# Patient Record
Sex: MALE | URBAN - METROPOLITAN AREA
[De-identification: names, ages, dates, MRNs, and addresses within clinical notes are randomized per-mention and may not be internally consistent; named-entity substitution may affect disease eponyms.]

---

## 2021-07-28 ENCOUNTER — APPOINTMENT (OUTPATIENT)
Dept: URBAN - METROPOLITAN AREA CLINIC 252 | Age: 81
Setting detail: DERMATOLOGY
End: 2021-07-28

## 2021-07-28 VITALS — HEIGHT: 65 IN | WEIGHT: 195 LBS | RESPIRATION RATE: 16 BRPM

## 2021-07-28 DIAGNOSIS — L57.0 ACTINIC KERATOSIS: ICD-10-CM

## 2021-07-28 DIAGNOSIS — L20.89 OTHER ATOPIC DERMATITIS: ICD-10-CM

## 2021-07-28 PROCEDURE — OTHER COUNSELING: OTHER

## 2021-07-28 PROCEDURE — OTHER PRESCRIPTION: OTHER

## 2021-07-28 PROCEDURE — 99203 OFFICE O/P NEW LOW 30 MIN: CPT | Mod: 25

## 2021-07-28 PROCEDURE — OTHER LIQUID NITROGEN: OTHER

## 2021-07-28 PROCEDURE — OTHER ADDITIONAL NOTES: OTHER

## 2021-07-28 PROCEDURE — 17003 DESTRUCT PREMALG LES 2-14: CPT

## 2021-07-28 PROCEDURE — 17000 DESTRUCT PREMALG LESION: CPT

## 2021-07-28 RX ORDER — BETAMETHASONE DIPROPIONATE 0.5 MG/G
CREAM TOPICAL BID PRN
Qty: 1 | Refills: 2 | COMMUNITY
Start: 2021-07-28

## 2021-07-28 ASSESSMENT — LOCATION SIMPLE DESCRIPTION DERM
LOCATION SIMPLE: RIGHT TEMPLE
LOCATION SIMPLE: LEFT PRETIBIAL REGION
LOCATION SIMPLE: RIGHT PRETIBIAL REGION
LOCATION SIMPLE: RIGHT ZYGOMA
LOCATION SIMPLE: LEFT CHEEK

## 2021-07-28 ASSESSMENT — LOCATION DETAILED DESCRIPTION DERM
LOCATION DETAILED: RIGHT CENTRAL ZYGOMA
LOCATION DETAILED: RIGHT LATERAL ZYGOMA
LOCATION DETAILED: LEFT CENTRAL MALAR CHEEK
LOCATION DETAILED: RIGHT CENTRAL TEMPLE
LOCATION DETAILED: LEFT PROXIMAL PRETIBIAL REGION
LOCATION DETAILED: LEFT SUPERIOR LATERAL MALAR CHEEK
LOCATION DETAILED: RIGHT PROXIMAL PRETIBIAL REGION

## 2021-07-28 ASSESSMENT — LOCATION ZONE DERM
LOCATION ZONE: LEG
LOCATION ZONE: FACE

## 2021-07-28 NOTE — HPI: RASH
How Severe Is Your Rash?: severe
Is This A New Presentation, Or A Follow-Up?: Rash
Additional History: Responds to betamethasone he reports immediatly. Uses eucerin cream as well periodically.

## 2021-07-28 NOTE — PROCEDURE: ADDITIONAL NOTES
Additional Notes: Continue using Eczema relief cream daily.
Detail Level: Detailed
Render Risk Assessment In Note?: no

## 2021-12-06 ENCOUNTER — LAB REQUISITION (OUTPATIENT)
Dept: LAB | Facility: CLINIC | Age: 81
End: 2021-12-06
Payer: COMMERCIAL

## 2021-12-06 DIAGNOSIS — I10 ESSENTIAL (PRIMARY) HYPERTENSION: ICD-10-CM

## 2021-12-06 LAB
ANION GAP SERPL CALCULATED.3IONS-SCNC: 14 MMOL/L (ref 5–18)
BASOPHILS # BLD AUTO: 0 10E3/UL (ref 0–0.2)
BASOPHILS NFR BLD AUTO: 1 %
BUN SERPL-MCNC: 9 MG/DL (ref 8–28)
CALCIUM SERPL-MCNC: 8.7 MG/DL (ref 8.5–10.5)
CHLORIDE BLD-SCNC: 109 MMOL/L (ref 98–107)
CO2 SERPL-SCNC: 18 MMOL/L (ref 22–31)
CREAT SERPL-MCNC: 0.8 MG/DL (ref 0.7–1.3)
EOSINOPHIL # BLD AUTO: 0.2 10E3/UL (ref 0–0.7)
EOSINOPHIL NFR BLD AUTO: 3 %
ERYTHROCYTE [DISTWIDTH] IN BLOOD BY AUTOMATED COUNT: 16.8 % (ref 10–15)
GFR SERPL CREATININE-BSD FRML MDRD: 85 ML/MIN/1.73M2
GLUCOSE BLD-MCNC: 66 MG/DL (ref 70–125)
HCT VFR BLD AUTO: 33.3 % (ref 40–53)
HGB BLD-MCNC: 10.2 G/DL (ref 13.3–17.7)
IMM GRANULOCYTES # BLD: 0 10E3/UL
IMM GRANULOCYTES NFR BLD: 0 %
LYMPHOCYTES # BLD AUTO: 1.6 10E3/UL (ref 0.8–5.3)
LYMPHOCYTES NFR BLD AUTO: 27 %
MCH RBC QN AUTO: 30.4 PG (ref 26.5–33)
MCHC RBC AUTO-ENTMCNC: 30.6 G/DL (ref 31.5–36.5)
MCV RBC AUTO: 99 FL (ref 78–100)
MONOCYTES # BLD AUTO: 0.8 10E3/UL (ref 0–1.3)
MONOCYTES NFR BLD AUTO: 12 %
NEUTROPHILS # BLD AUTO: 3.5 10E3/UL (ref 1.6–8.3)
NEUTROPHILS NFR BLD AUTO: 57 %
NRBC # BLD AUTO: 0 10E3/UL
NRBC BLD AUTO-RTO: 0 /100
PLATELET # BLD AUTO: 229 10E3/UL (ref 150–450)
POTASSIUM BLD-SCNC: 3.9 MMOL/L (ref 3.5–5)
RBC # BLD AUTO: 3.35 10E6/UL (ref 4.4–5.9)
SODIUM SERPL-SCNC: 141 MMOL/L (ref 136–145)
VIT B12 SERPL-MCNC: 994 PG/ML (ref 213–816)
WBC # BLD AUTO: 6.2 10E3/UL (ref 4–11)

## 2021-12-06 PROCEDURE — 82306 VITAMIN D 25 HYDROXY: CPT | Mod: ORL | Performed by: NURSE PRACTITIONER

## 2021-12-06 PROCEDURE — 80048 BASIC METABOLIC PNL TOTAL CA: CPT | Mod: ORL | Performed by: NURSE PRACTITIONER

## 2021-12-06 PROCEDURE — 85025 COMPLETE CBC W/AUTO DIFF WBC: CPT | Mod: ORL | Performed by: NURSE PRACTITIONER

## 2021-12-06 PROCEDURE — 82607 VITAMIN B-12: CPT | Mod: ORL | Performed by: NURSE PRACTITIONER

## 2021-12-07 LAB — DEPRECATED CALCIDIOL+CALCIFEROL SERPL-MC: 36 UG/L (ref 30–80)

## 2022-05-03 ENCOUNTER — TRANSFERRED RECORDS (OUTPATIENT)
Dept: HEALTH INFORMATION MANAGEMENT | Facility: CLINIC | Age: 82
End: 2022-05-03
Payer: COMMERCIAL

## 2022-05-03 ENCOUNTER — MEDICAL CORRESPONDENCE (OUTPATIENT)
Dept: HEALTH INFORMATION MANAGEMENT | Facility: CLINIC | Age: 82
End: 2022-05-03
Payer: COMMERCIAL

## 2022-05-03 PROBLEM — C90.01 MULTIPLE MYELOMA IN REMISSION (H): Status: ACTIVE | Noted: 2022-01-19

## 2022-05-03 PROBLEM — N18.31 STAGE 3A CHRONIC KIDNEY DISEASE (H): Status: ACTIVE | Noted: 2021-07-25

## 2022-05-03 PROBLEM — I25.119 CORONARY ARTERY DISEASE INVOLVING NATIVE HEART WITH ANGINA PECTORIS (H): Status: ACTIVE | Noted: 2018-05-25

## 2022-05-03 PROBLEM — J30.9 ALLERGIC RHINITIS: Status: ACTIVE | Noted: 2021-12-01

## 2022-05-03 PROBLEM — R25.1 TREMOR: Status: ACTIVE | Noted: 2022-05-03

## 2022-05-03 PROBLEM — I25.10 ARTERIOSCLEROSIS OF CORONARY ARTERY: Status: ACTIVE | Noted: 2018-06-22

## 2022-05-03 PROBLEM — E78.5 HYPERLIPIDEMIA LDL GOAL <70: Status: ACTIVE | Noted: 2018-06-22

## 2022-05-03 RX ORDER — FERROUS SULFATE 325(65) MG
325 TABLET ORAL DAILY
COMMUNITY
Start: 2021-03-21

## 2022-05-03 RX ORDER — ALBUTEROL SULFATE 90 UG/1
1-2 AEROSOL, METERED RESPIRATORY (INHALATION) EVERY 6 HOURS PRN
COMMUNITY
Start: 2021-04-13 | End: 2022-09-27

## 2022-05-03 RX ORDER — BETAMETHASONE DIPROPIONATE 0.5 MG/G
CREAM TOPICAL
COMMUNITY
End: 2022-09-27

## 2022-05-03 RX ORDER — QUETIAPINE FUMARATE 25 MG/1
25 TABLET, FILM COATED ORAL AT BEDTIME
COMMUNITY
End: 2022-09-27

## 2022-05-03 RX ORDER — MIDODRINE HYDROCHLORIDE 2.5 MG/1
TABLET ORAL
COMMUNITY
Start: 2022-05-03 | End: 2022-09-27

## 2022-05-03 RX ORDER — FLUTICASONE PROPIONATE 50 MCG
1-2 SPRAY, SUSPENSION (ML) NASAL DAILY PRN
COMMUNITY

## 2022-05-03 RX ORDER — LENALIDOMIDE 10 MG/1
CAPSULE ORAL
COMMUNITY
Start: 2022-04-15 | End: 2022-09-27

## 2022-05-03 RX ORDER — LATANOPROST 50 UG/ML
SOLUTION/ DROPS OPHTHALMIC
COMMUNITY
End: 2022-09-27

## 2022-05-03 RX ORDER — PROCHLORPERAZINE MALEATE 10 MG
TABLET ORAL
COMMUNITY
Start: 2021-07-27 | End: 2022-09-27

## 2022-05-03 RX ORDER — FAMOTIDINE 20 MG/1
TABLET, FILM COATED ORAL
COMMUNITY
Start: 2022-02-16

## 2022-05-03 RX ORDER — ONDANSETRON 4 MG/1
4 TABLET, ORALLY DISINTEGRATING ORAL DAILY PRN
COMMUNITY
Start: 2021-11-16 | End: 2022-10-03

## 2022-05-03 RX ORDER — LANOLIN ALCOHOL/MO/W.PET/CERES
6 CREAM (GRAM) TOPICAL AT BEDTIME
COMMUNITY
Start: 2021-11-29 | End: 2022-10-03

## 2022-05-03 RX ORDER — NITROGLYCERIN 0.4 MG/1
0.4 TABLET SUBLINGUAL PRN
COMMUNITY
Start: 2020-08-11

## 2022-05-03 RX ORDER — FLUOCINONIDE 0.5 MG/G
1 OINTMENT TOPICAL 2 TIMES DAILY
COMMUNITY

## 2022-05-03 RX ORDER — ZINC SULFATE 50(220)MG
220 CAPSULE ORAL DAILY
COMMUNITY

## 2022-05-03 RX ORDER — LOPERAMIDE HCL 2 MG
CAPSULE ORAL
COMMUNITY
Start: 2021-11-09

## 2022-09-06 ENCOUNTER — APPOINTMENT (OUTPATIENT)
Dept: URBAN - METROPOLITAN AREA CLINIC 252 | Age: 82
Setting detail: DERMATOLOGY
End: 2022-09-12

## 2022-09-06 VITALS — RESPIRATION RATE: 16 BRPM | HEIGHT: 65 IN | WEIGHT: 187 LBS

## 2022-09-06 DIAGNOSIS — L72.0 EPIDERMAL CYST: ICD-10-CM

## 2022-09-06 DIAGNOSIS — L57.0 ACTINIC KERATOSIS: ICD-10-CM

## 2022-09-06 PROCEDURE — 99212 OFFICE O/P EST SF 10 MIN: CPT | Mod: 25

## 2022-09-06 PROCEDURE — OTHER LIQUID NITROGEN: OTHER

## 2022-09-06 PROCEDURE — 17003 DESTRUCT PREMALG LES 2-14: CPT

## 2022-09-06 PROCEDURE — OTHER COUNSELING: OTHER

## 2022-09-06 PROCEDURE — 17000 DESTRUCT PREMALG LESION: CPT

## 2022-09-06 ASSESSMENT — LOCATION SIMPLE DESCRIPTION DERM
LOCATION SIMPLE: LEFT ZYGOMA
LOCATION SIMPLE: RIGHT TEMPLE
LOCATION SIMPLE: LEFT EAR
LOCATION SIMPLE: LEFT CHEEK

## 2022-09-06 ASSESSMENT — LOCATION DETAILED DESCRIPTION DERM
LOCATION DETAILED: LEFT SUPERIOR LATERAL MALAR CHEEK
LOCATION DETAILED: RIGHT CENTRAL TEMPLE
LOCATION DETAILED: LEFT CENTRAL ZYGOMA
LOCATION DETAILED: LEFT INFERIOR CENTRAL MALAR CHEEK
LOCATION DETAILED: LEFT SUPERIOR CRUS OF ANTIHELIX

## 2022-09-06 ASSESSMENT — LOCATION ZONE DERM
LOCATION ZONE: EAR
LOCATION ZONE: FACE

## 2022-09-06 NOTE — PROCEDURE: LIQUID NITROGEN
Consent: The patient's consent was obtained including but not limited to risks of crusting, scabbing, blistering, scarring, darker or lighter pigmentary change, recurrence, incomplete removal and infection.
Render Note In Bullet Format When Appropriate: No
Duration Of Freeze Thaw-Cycle (Seconds): 0
Show Aperture Variable?: Yes
Post-Care Instructions: I reviewed with the patient in detail post-care instructions. Patient is to wear sunprotection, and avoid picking at any of the treated lesions. Pt may apply Vaseline to crusted or scabbing areas.
Detail Level: Detailed

## 2022-09-15 NOTE — TELEPHONE ENCOUNTER
Action 9/15/22 MV 10.45am   Action Taken Imaging request faxed to [facility]    9/16/22 MV 2.53pm  Images resolved iN PACS         RECORDS RECEIVED FROM: internal   REASON FOR VISIT: Essential tremor  low bp and tremor meds drive it down further   Date of Appt: 10/3/22   NOTES (FOR ALL VISITS) STATUS DETAILS   OFFICE NOTE from referring provider Received  Dr Raheem Coley @ [facility]:  5/3/22   MEDICATION LIST Care Everywhere    IMAGING  (FOR ALL VISITS)     MRI (HEAD, NECK, SPINE) Received [facility]:  MRI Brain 12/21/19  MRA Head Neck 12/21/19   CT (HEAD, NECK, SPINE) Received [facility]:  CT Head 11/24/21  CT Head 11/22/21  CT Head 11/16/21

## 2022-09-27 RX ORDER — NITROGLYCERIN 0.4 MG/1
0.4 TABLET SUBLINGUAL EVERY 5 MIN PRN
COMMUNITY
Start: 2022-05-13 | End: 2022-10-03

## 2022-09-27 RX ORDER — QUETIAPINE FUMARATE 25 MG/1
25 TABLET, FILM COATED ORAL
COMMUNITY
End: 2022-10-03

## 2022-09-27 RX ORDER — ALBUTEROL SULFATE 90 UG/1
1-2 AEROSOL, METERED RESPIRATORY (INHALATION) EVERY 4 HOURS PRN
COMMUNITY
Start: 2021-04-13 | End: 2022-10-03

## 2022-09-27 RX ORDER — CHOLESTYRAMINE LIGHT 4 G/5.7G
1 POWDER, FOR SUSPENSION ORAL DAILY
COMMUNITY
Start: 2021-11-30 | End: 2022-10-03

## 2022-09-27 RX ORDER — LENALIDOMIDE 10 MG/1
10 CAPSULE ORAL DAILY
COMMUNITY
Start: 2022-08-04

## 2022-09-27 RX ORDER — LANOLIN ALCOHOL/MO/W.PET/CERES
6 CREAM (GRAM) TOPICAL EVERY EVENING
COMMUNITY
Start: 2021-11-29 | End: 2022-10-03

## 2022-09-27 RX ORDER — BRINZOLAMIDE/BRIMONIDINE TARTRATE 10; 2 MG/ML; MG/ML
1 SUSPENSION/ DROPS OPHTHALMIC 2 TIMES DAILY
COMMUNITY
Start: 2021-11-30

## 2022-09-27 RX ORDER — MIDODRINE HYDROCHLORIDE 2.5 MG/1
TABLET ORAL
COMMUNITY
Start: 2021-10-05

## 2022-09-27 RX ORDER — MV-MIN/FOLIC/VIT K/LYCOP/COQ10 200-100MCG
CAPSULE ORAL
COMMUNITY
Start: 2022-06-17

## 2022-09-27 RX ORDER — PROCHLORPERAZINE MALEATE 10 MG
10 TABLET ORAL
COMMUNITY
Start: 2021-07-27

## 2022-09-27 RX ORDER — ACYCLOVIR 400 MG/1
400 TABLET ORAL 2 TIMES DAILY
COMMUNITY
Start: 2021-07-27

## 2022-09-27 RX ORDER — BETAMETHASONE DIPROPIONATE 0.5 MG/G
CREAM TOPICAL 2 TIMES DAILY PRN
COMMUNITY

## 2022-09-27 RX ORDER — ONDANSETRON 4 MG/1
1 TABLET, ORALLY DISINTEGRATING ORAL EVERY 8 HOURS PRN
COMMUNITY
Start: 2021-12-22

## 2022-09-27 RX ORDER — FAMOTIDINE 20 MG/1
1 TABLET, FILM COATED ORAL 2 TIMES DAILY
COMMUNITY
Start: 2022-02-16 | End: 2022-10-03

## 2022-09-27 RX ORDER — SULFAMETHOXAZOLE/TRIMETHOPRIM 800-160 MG
1 TABLET ORAL
COMMUNITY
Start: 2022-06-24

## 2022-09-27 RX ORDER — ATORVASTATIN CALCIUM 40 MG/1
40 TABLET, FILM COATED ORAL DAILY
COMMUNITY
Start: 2022-05-13

## 2022-09-27 RX ORDER — FLUOXETINE 40 MG/1
1 CAPSULE ORAL DAILY
COMMUNITY
Start: 2022-03-03

## 2022-09-27 RX ORDER — PRIMIDONE 50 MG/1
12.5 TABLET ORAL
COMMUNITY
Start: 2022-05-04 | End: 2022-10-03

## 2022-09-27 RX ORDER — BUPROPION HYDROCHLORIDE 300 MG/1
300 TABLET ORAL DAILY
COMMUNITY
Start: 2022-09-26 | End: 2023-09-26

## 2022-09-27 NOTE — PROGRESS NOTES
"Department of Neurology  Movement Disorders Division   New Patient Visit    Patient: Mk Diego   MRN: [id number]   : 1940   Date of Visit: 10/3/2022    CC: essential tremor, ?DBS    HPI:  Mr. Diego is an 81 yo right handed man who presents for treatment of ET.  He is accompanied by his wfie and daughter who help to provide history.  His tremor has been present for at least 20 years but has worsened over the past 10 years.  It is worse in his right hand.  Recently started to notice head tremor while walking.  While resting and standing, his legs \"begin to jump\".  His two brothers have it as well.He has a lot of trouble doing anything fine with is hands.  He can no longer reload his guns, work as a , or eat.  His balance is poor.  He is falling daily but has been able to catch himself.  His balance is worse when walking slowing.    He had a concussion last November.  Memory is good.    Hypotension with propranolol.  He has been increasing primidone.  Initially had sleepiness and \"foggy head\" but this has improved.  Maybe helped with his tremor at first.    No hx of kidney stones.  He doesn't drink alcohol.       Tremor ADL Scale  1. Speaking 0 (0) Normal   2. Feeding with a spoon 3 (3) Moderately abnormal. Spills a lot or changes strategy to complete task such as using two hands or leaning over.   3. Drinking from a glass 4 (4) Severely abnormal. Cannot drink from a glass or uses straw or sippy cup.   4. Hygiene 3 (3) Moderately abnormal. Unable to do most fine tasks such as putting on lipstick or shaving unless changes strategy such as using two hands or using the less affected hand.   5. Dressing 2 (2) Mildly abnormal. Able to do everything but has difficulty due to tremor.   6. Pouring 3 (3) Moderately abnormal. Must use two hands or uses other strategies to avoid spilling.   7. Carrying food trays, plates or similar items 3 (3) Moderately abnormal. Uses strategies such as holding tightly " against body to carry.   8. Using keys 2 (2) Mildly abnormal. Commonly misses target but still routinely puts key in lock with one hand.   9. Writing 3 (3) Moderately abnormal. Cannot write without using strategies such as holding the writing hand with the other hand, holding pen differently or using large pen.   10. Working 3 (3) Moderately abnormal. Unable to continue working without using strategies such as changing jobs or using special equipment.   11. Overall disability with the most affected task 4 (4) Severely abnormal. Cannot do the task.   Name of most affected task Reloading gun, shooting Reloading gun, shooting   12. Social impact 1 (1) Aware of tremor, but it does not affect lifestyle or professional life.   ADL TOTAL 31        Related Medications Afternoon Evening   Primidone 50mg 0.5 1   Last taken at last night 10/2/2022    ROS:  All others negative except as listed above.    Past Medical History:   Diagnosis Date     BPH (benign prostatic hyperplasia)      Chronic gastritis      Chronic kidney disease, stage 3a (H)      Coronary arteriosclerosis      ED (erectile dysfunction)      Hip arthritis      Hypotension      Iron deficiency anemia      Major depressive disorder      MGUS (monoclonal gammopathy of unknown significance)      Multiple myeloma (H)      Normocytic normochromic anemia      Primary insomnia      Seborrheic dermatitis      Tremor 05/03/2022     Vitamin D deficiency         Past Surgical History:   Procedure Laterality Date     ARTHRODESIS WRIST Bilateral     x2 right , then x1 on left prior to the 2014     CV PCI       HC REPAIR INTERCARP/CARP-METACARP JT Left 07/01/2014    Procedure: CARPAL METACARPAL ARTHROPLASTY THUMB;  Surgeon: Nomi Judd MD;  Location: [facility];  Service: Orthopedics     LAPAROSCOPIC CHOLECYSTECTOMY       OTHER SURGICAL HISTORY      paraesophageal hiatal hernia repair     ZZC FUSION INTERCARPAL Left 08/09/2016    Procedure: LEFT ARTHRODESIS  REVISION LEFT WRIST WITH ILIAC CREST BONE GRAFT;  Surgeon: Nomi Judd MD;  Location: [facility];  Service: Orthopedics        Current Outpatient Medications   Medication Sig Dispense Refill     Acetylcysteine 600 MG TBCR        acyclovir (ZOVIRAX) 400 MG tablet Take 400 mg by mouth 2 times daily       atorvastatin (LIPITOR) 40 MG tablet Take 40 mg by mouth daily       betamethasone dipropionate (DIPROSONE) 0.05 % external cream Apply topically 2 times daily as needed       brinzolamide-brimonidine (SIMBRINZA) 1-0.2 % ophthalmic suspension Apply 1 drop to eye 2 times daily       buPROPion (WELLBUTRIN XL) 300 MG 24 hr tablet Take 300 mg by mouth daily       cholecalciferol 25 MCG (1000 UT) TABS Take 1,000 Units by mouth daily       famotidine (PEPCID) 20 MG tablet 20mg       ferrous sulfate (FEROSUL) 325 (65 Fe) MG tablet Take 325 mg by mouth daily       fluocinonide (LIDEX) 0.05 % external ointment Apply 1 Application topically 2 times daily       FLUoxetine (PROZAC) 40 MG capsule Take 1 capsule by mouth daily       fluticasone (FLONASE) 50 MCG/ACT nasal spray Spray 1-2 sprays in nostril daily as needed       latanoprost (XALATAN) 0.005 % ophthalmic solution [LATANOPROST (XALATAN) 0.005 % OPHTHALMIC SOLUTION] Administer 1 drop to both eyes bedtime.       LENalidomide (REVLIMID) 10 MG CAPS capsule Take 10 mg by mouth daily On day 1-21 of each 28 day cycle       loperamide (IMODIUM) 2 MG capsule Take 4mg by mouth with 1st loose stool, then 2mg with each subsequent loose stool. Max 16 mg in 24 hrs       midodrine (PROAMATINE) 2.5 MG tablet 15mg in the morning and 12.5mg in the evening       Multiple Vitamins-Minerals (DAILY MULTIVITAMIN) CAPS        nitroGLYcerin (NITROSTAT) 0.4 MG sublingual tablet Place 0.4 mg under the tongue as needed       ondansetron (ZOFRAN ODT) 4 MG ODT tab Take 1 tablet by mouth every 8 hours as needed       primidone (MYSOLINE) 50 MG tablet Take 12.5 mg by mouth       Probiotic  Product (PROBIOTIC PO) Take 1 capsule by mouth       prochlorperazine (COMPAZINE) 10 MG tablet Take 10 mg by mouth       sulfamethoxazole-trimethoprim (BACTRIM DS) 800-160 MG tablet Take 1 tablet by mouth Every Monday, Wednesday, and Friday       vitamin B-12 (CYANOCOBALAMIN) 1000 MCG tablet Take 1,000 mcg by mouth daily       zinc sulfate (ZINCATE) 220 (50 Zn) MG capsule Take 220 mg by mouth daily         Allergies   Allergen Reactions     Codeine Nausea and Vomiting and Nausea     Patient reports that he has tolerated morphine in the past  Patient reports that he has tolerated morphine in the past       Demerol [Meperidine] Nausea and Vomiting     Fentanyl Nausea and Vomiting     Prilocaine Hives and Unknown     Afluria Preservative Free Nausea and Vomiting     Anesthetics, Amide [Amides]      Dry heaves     Lidocaine Unknown and Other (See Comments)     Dry heaves  Dry heaves     Morphine Nausea and Nausea and Vomiting     Tamsulosin Other (See Comments)     Other reaction(s): Hypotension        Family History   Problem Relation Age of Onset     Osteoporosis Mother      Depression Father      Tremor Brother      Tremor Brother      Cancer Maternal Grandfather      Tremor Niece         Social History     Socioeconomic History     Marital status:      Spouse name: Not on file     Number of children: Not on file     Years of education: Not on file     Highest education level: Not on file   Occupational History     Not on file   Tobacco Use     Smoking status: Former Smoker     Packs/day: 4.00     Years: 7.00     Pack years: 28.00     Quit date: 1968     Years since quittin.2     Smokeless tobacco: Never Used   Substance and Sexual Activity     Alcohol use: No     Comment: Alcoholic Drinks/day: quit      Drug use: No     Sexual activity: Not on file   Other Topics Concern     Not on file   Social History Narrative     Not on file     Social Determinants of Health     Financial Resource Strain:  "Not on file   Food Insecurity: Not on file   Transportation Needs: Not on file   Physical Activity: Not on file   Stress: Not on file   Social Connections: Not on file   Intimate Partner Violence: Not on file   Housing Stability: Not on file        PHYSICAL EXAM:  BP (!) 147/70 (BP Location: Left arm, Patient Position: Chair, Cuff Size: Adult Regular)   Pulse 73   Ht 1.651 m (5' 5\")   Wt 85.2 kg (187 lb 14.4 oz)   SpO2 99%   BMI 31.27 kg/m      Gen: alert, active, attentive, appropriately groomed   HEENT: normocephalic, eyes open with no discharge  Psych: mood stable     NEURO:  CN:  II:  VFF.  III, IV, VI: EOM normal range, no nystagmus.  Normal saccades.  V:  Facial sensation intact.  VII: Face symmetric at rest and with activation  VIII: Intact to finger rub bilaterally.  IX, X: Palate rise b/l, uvula midline.  No dysarthria.  XI: Trapezius and SCM 5/5 bilaterally.  XII: Tongue protrudes midline. No fasciculation or atrophy noted.    Motor:  Normal bulk and tone throughout upper and lower extremities. See TETRAS below.  R/L  Shoulder abd      5/5  Elbow flex  5/5  Elbow ext  5/5     5/5    Hip flex   5/5  Knee flex  5/5  Knee ext  5/5  Dorsiflex  5/5  Plantar flex  5/5    Reflexes:  R/L  Biceps   1+/2+  Patellar  2+/1+  Achilles  1/1  No clonus.    Sensation:  Intact to LT in all extremities.    Coordination:  FTN and HTN intact bilaterally.    Gait:  Unsteady on quick pivot turn, narrow based.  Romberg negative.      TETRAS:  Motor (Performance) Sub-Scale 10/3/2022   Assessment Time 8:20 AM   Medication On   DBS - Right Brain None   DBS - Left Brain None   Head 1.5   Face & Jaw 0   Voice 2   Outstretched - RIGHT 1.5   Outstretched - LEFT 1.5   Wingbeating - RIGHT 2.5   Wingbeating - LEFT 2   Kinetic - RIGHT 1.5   Kinetic - LEFT 2   Lower Limb - RIGHT 0   Lower Limb - LEFT 2   Lower Limb (Max) 2   Spiral - RIGHT 4   Spiral - LEFT 3   Handwriting 4   Dot approx - RIGHT 2   Dot approx - LEFT 2.5   Trunk " (Standing) 2   Total Right 11.5   Total Left 13   Axial 3.5   TOTAL 34           ASSESSMENT/PLAN:  Mr. Diego is an 83 yo right handed man who presents for treatment of ET.  He did not tolerate propranolol due to hypotension and, thus far, primidone has not been effective enough in controlling his tremor.  We discussed the options of increasing primidone, topiramate, and gabapentin.  We also discussed DBS therapy.  He is interested in beginning the evaluation process while we increase his primidone further.    - Increase primidone to 100mg BID as tolerated  - Neuropsych and neurosurgery referrals placed  - 3T MRI ordered      Sada Matt MD    Movement Disorders Division  Department of Neurology      53 minutes spent on the date of the encounter doing chart review, history and exam, documentation and further activities as noted above.

## 2022-10-03 ENCOUNTER — OFFICE VISIT (OUTPATIENT)
Dept: NEUROLOGY | Facility: CLINIC | Age: 82
End: 2022-10-03
Payer: COMMERCIAL

## 2022-10-03 ENCOUNTER — PRE VISIT (OUTPATIENT)
Dept: NEUROLOGY | Facility: CLINIC | Age: 82
End: 2022-10-03

## 2022-10-03 ENCOUNTER — TELEPHONE (OUTPATIENT)
Dept: NEUROLOGY | Facility: CLINIC | Age: 82
End: 2022-10-03

## 2022-10-03 VITALS
BODY MASS INDEX: 31.31 KG/M2 | WEIGHT: 187.9 LBS | HEIGHT: 65 IN | SYSTOLIC BLOOD PRESSURE: 147 MMHG | OXYGEN SATURATION: 99 % | DIASTOLIC BLOOD PRESSURE: 70 MMHG | HEART RATE: 73 BPM

## 2022-10-03 DIAGNOSIS — G25.0 ESSENTIAL TREMOR: Primary | ICD-10-CM

## 2022-10-03 DIAGNOSIS — R26.81 GAIT INSTABILITY: ICD-10-CM

## 2022-10-03 DIAGNOSIS — I95.1 ORTHOSTATIC HYPOTENSION: ICD-10-CM

## 2022-10-03 PROCEDURE — 99204 OFFICE O/P NEW MOD 45 MIN: CPT | Performed by: STUDENT IN AN ORGANIZED HEALTH CARE EDUCATION/TRAINING PROGRAM

## 2022-10-03 RX ORDER — PRIMIDONE 50 MG/1
TABLET ORAL
Qty: 120 TABLET | Refills: 11 | Status: SHIPPED | OUTPATIENT
Start: 2022-10-03 | End: 2023-11-13

## 2022-10-03 ASSESSMENT — ACTIVITIES OF DAILY LIVING (ADL)
WRITING: (3) MODERATELY ABNORMAL. CANNOT WRITE WITHOUT USING STRATEGIES SUCH AS HOLDING THE WRITING HAND WITH THE OTHER HAND, HOLDING PEN DIFFERENTLY OR USING LARGE PEN.
HYGIENE: (3) MODERATELY ABNORMAL. UNABLE TO DO MOST FINE TASKS SUCH AS PUTTING ON LIPSTICK OR SHAVING UNLESS CHANGES STRATEGY SUCH AS USING TWO HANDS OR USING THE LESS AFFECTED HAND.
TOTAL_SCORE: 31
USING_KEYS: (2) MILDLY ABNORMAL. COMMONLY MISSES TARGET BUT STILL ROUTINELY PUTS KEY IN LOCK WITH ONE HAND.
SOCIAL_IMPACT: (1) AWARE OF TREMOR, BUT IT DOES NOT AFFECT LIFESTYLE OR PROFESSIONAL LIFE.
FEEDING_WITH_A_SPOON: (3) MODERATELY ABNORMAL. SPILLS A LOT OR CHANGES STRATEGY TO COMPLETE TASK SUCH AS USING TWO HANDS OR LEANING OVER.
OVERALL_DISABILITY_WITH_THE_MOST_AFFECTED_TASK: (4) SEVERELY ABNORMAL. CANNOT DO THE TASK.
SPEAKING: (0) NORMAL
CARRYING_FOOD_TRAYS_PLATES_OR_SIMILAR_ITEMS: (3) MODERATELY ABNORMAL. USES STRATEGIES SUCH AS HOLDING TIGHTLY AGAINST BODY TO CARRY.
DRINKING_FROM_A_GLASS: (4) SEVERELY ABNORMAL. CANNOT DRINK FROM A GLASS OR USES STRAW OR SIPPY CUP.
WORKING: (3) MODERATELY ABNORMAL. UNABLE TO CONTINUE WORKING WITHOUT USING STRATEGIES SUCH AS CHANGING JOBS OR USING SPECIAL EQUIPMENT.
DRESS: (2) MILDLY ABNORMAL. ABLE TO DO EVERYTHING BUT HAS DIFFICULTY DUE TO TREMOR.
POURING: (3) MODERATELY ABNORMAL. MUST USE TWO HANDS OR USES OTHER STRATEGIES TO AVOID SPILLING.

## 2022-10-03 ASSESSMENT — PAIN SCALES - GENERAL: PAINLEVEL: NO PAIN (0)

## 2022-10-03 NOTE — LETTER
"10/3/2022       RE: Mk Diego  [address] [facility] [address]     Dear Colleague,    Thank you for referring your patient, Mk Diego, to the [facility] [place] [facility]. Please see a copy of my visit note below.    Department of Neurology  Movement Disorders Division   New Patient Visit    Patient: Mk Diego   MRN: [id number]   : 1940   Date of Visit: 10/3/2022    CC: essential tremor, ?DBS    HPI:  Mr. Diego is an 81 yo right handed man who presents for treatment of ET.  He is accompanied by his wfie and daughter who help to provide history.  His tremor has been present for at least 20 years but has worsened over the past 10 years.  It is worse in his right hand.  Recently started to notice head tremor while walking.  While resting and standing, his legs \"begin to jump\".  His two brothers have it as well.He has a lot of trouble doing anything fine with is hands.  He can no longer reload his guns, work as a , or eat.  His balance is poor.  He is falling daily but has been able to catch himself.  His balance is worse when walking slowing.    He had a concussion last November.  Memory is good.    Hypotension with propranolol.  He has been increasing primidone.  Initially had sleepiness and \"foggy head\" but this has improved.  Maybe helped with his tremor at first.    No hx of kidney stones.  He doesn't drink alcohol.       Tremor ADL Scale  1. Speaking 0 (0) Normal   2. Feeding with a spoon 3 (3) Moderately abnormal. Spills a lot or changes strategy to complete task such as using two hands or leaning over.   3. Drinking from a glass 4 (4) Severely abnormal. Cannot drink from a glass or uses straw or sippy cup.   4. Hygiene 3 (3) Moderately abnormal. Unable to do most fine tasks such as putting on lipstick or shaving unless changes strategy such as using two hands or using the less affected hand.   5. " Dressing 2 (2) Mildly abnormal. Able to do everything but has difficulty due to tremor.   6. Pouring 3 (3) Moderately abnormal. Must use two hands or uses other strategies to avoid spilling.   7. Carrying food trays, plates or similar items 3 (3) Moderately abnormal. Uses strategies such as holding tightly against body to carry.   8. Using keys 2 (2) Mildly abnormal. Commonly misses target but still routinely puts key in lock with one hand.   9. Writing 3 (3) Moderately abnormal. Cannot write without using strategies such as holding the writing hand with the other hand, holding pen differently or using large pen.   10. Working 3 (3) Moderately abnormal. Unable to continue working without using strategies such as changing jobs or using special equipment.   11. Overall disability with the most affected task 4 (4) Severely abnormal. Cannot do the task.   Name of most affected task Reloading gun, shooting Reloading gun, shooting   12. Social impact 1 (1) Aware of tremor, but it does not affect lifestyle or professional life.   ADL TOTAL 31        Related Medications Afternoon Evening   Primidone 50mg 0.5 1   Last taken at last night 10/2/2022    ROS:  All others negative except as listed above.    Past Medical History:   Diagnosis Date     BPH (benign prostatic hyperplasia)      Chronic gastritis      Chronic kidney disease, stage 3a (H)      Coronary arteriosclerosis      ED (erectile dysfunction)      Hip arthritis      Hypotension      Iron deficiency anemia      Major depressive disorder      MGUS (monoclonal gammopathy of unknown significance)      Multiple myeloma (H)      Normocytic normochromic anemia      Primary insomnia      Seborrheic dermatitis      Tremor 05/03/2022     Vitamin D deficiency         Past Surgical History:   Procedure Laterality Date     ARTHRODESIS WRIST Bilateral     x2 right , then x1 on left prior to the 2014     CV PCI       HC REPAIR INTERCARP/CARP-METACARP JT Left 07/01/2014     Procedure: CARPAL METACARPAL ARTHROPLASTY THUMB;  Surgeon: Nomi Judd MD;  Location: [facility];  Service: Orthopedics     LAPAROSCOPIC CHOLECYSTECTOMY       OTHER SURGICAL HISTORY      paraesophageal hiatal hernia repair     ZZC FUSION INTERCARPAL Left 08/09/2016    Procedure: LEFT ARTHRODESIS REVISION LEFT WRIST WITH ILIAC CREST BONE GRAFT;  Surgeon: Nomi Judd MD;  Location: [facility];  Service: Orthopedics        Current Outpatient Medications   Medication Sig Dispense Refill     Acetylcysteine 600 MG TBCR        acyclovir (ZOVIRAX) 400 MG tablet Take 400 mg by mouth 2 times daily       atorvastatin (LIPITOR) 40 MG tablet Take 40 mg by mouth daily       betamethasone dipropionate (DIPROSONE) 0.05 % external cream Apply topically 2 times daily as needed       brinzolamide-brimonidine (SIMBRINZA) 1-0.2 % ophthalmic suspension Apply 1 drop to eye 2 times daily       buPROPion (WELLBUTRIN XL) 300 MG 24 hr tablet Take 300 mg by mouth daily       cholecalciferol 25 MCG (1000 UT) TABS Take 1,000 Units by mouth daily       famotidine (PEPCID) 20 MG tablet 20mg       ferrous sulfate (FEROSUL) 325 (65 Fe) MG tablet Take 325 mg by mouth daily       fluocinonide (LIDEX) 0.05 % external ointment Apply 1 Application topically 2 times daily       FLUoxetine (PROZAC) 40 MG capsule Take 1 capsule by mouth daily       fluticasone (FLONASE) 50 MCG/ACT nasal spray Spray 1-2 sprays in nostril daily as needed       latanoprost (XALATAN) 0.005 % ophthalmic solution [LATANOPROST (XALATAN) 0.005 % OPHTHALMIC SOLUTION] Administer 1 drop to both eyes bedtime.       LENalidomide (REVLIMID) 10 MG CAPS capsule Take 10 mg by mouth daily On day 1-21 of each 28 day cycle       loperamide (IMODIUM) 2 MG capsule Take 4mg by mouth with 1st loose stool, then 2mg with each subsequent loose stool. Max 16 mg in 24 hrs       midodrine (PROAMATINE) 2.5 MG tablet 15mg in the morning and 12.5mg in the evening       Multiple  Vitamins-Minerals (DAILY MULTIVITAMIN) CAPS        nitroGLYcerin (NITROSTAT) 0.4 MG sublingual tablet Place 0.4 mg under the tongue as needed       ondansetron (ZOFRAN ODT) 4 MG ODT tab Take 1 tablet by mouth every 8 hours as needed       primidone (MYSOLINE) 50 MG tablet Take 12.5 mg by mouth       Probiotic Product (PROBIOTIC PO) Take 1 capsule by mouth       prochlorperazine (COMPAZINE) 10 MG tablet Take 10 mg by mouth       sulfamethoxazole-trimethoprim (BACTRIM DS) 800-160 MG tablet Take 1 tablet by mouth Every Monday, Wednesday, and Friday       vitamin B-12 (CYANOCOBALAMIN) 1000 MCG tablet Take 1,000 mcg by mouth daily       zinc sulfate (ZINCATE) 220 (50 Zn) MG capsule Take 220 mg by mouth daily         Allergies   Allergen Reactions     Codeine Nausea and Vomiting and Nausea     Patient reports that he has tolerated morphine in the past  Patient reports that he has tolerated morphine in the past       Demerol [Meperidine] Nausea and Vomiting     Fentanyl Nausea and Vomiting     Prilocaine Hives and Unknown     Afluria Preservative Free Nausea and Vomiting     Anesthetics, Amide [Amides]      Dry heaves     Lidocaine Unknown and Other (See Comments)     Dry heaves  Dry heaves     Morphine Nausea and Nausea and Vomiting     Tamsulosin Other (See Comments)     Other reaction(s): Hypotension        Family History   Problem Relation Age of Onset     Osteoporosis Mother      Depression Father      Tremor Brother      Tremor Brother      Cancer Maternal Grandfather      Tremor Niece         Social History     Socioeconomic History     Marital status:      Spouse name: Not on file     Number of children: Not on file     Years of education: Not on file     Highest education level: Not on file   Occupational History     Not on file   Tobacco Use     Smoking status: Former Smoker     Packs/day: 4.00     Years: 7.00     Pack years: 28.00     Quit date: 1968     Years since quittin.2     Smokeless  "tobacco: Never Used   Substance and Sexual Activity     Alcohol use: No     Comment: Alcoholic Drinks/day: quit 1983     Drug use: No     Sexual activity: Not on file   Other Topics Concern     Not on file   Social History Narrative     Not on file     Social Determinants of Health     Financial Resource Strain: Not on file   Food Insecurity: Not on file   Transportation Needs: Not on file   Physical Activity: Not on file   Stress: Not on file   Social Connections: Not on file   Intimate Partner Violence: Not on file   Housing Stability: Not on file        PHYSICAL EXAM:  BP (!) 147/70 (BP Location: Left arm, Patient Position: Chair, Cuff Size: Adult Regular)   Pulse 73   Ht 1.651 m (5' 5\")   Wt 85.2 kg (187 lb 14.4 oz)   SpO2 99%   BMI 31.27 kg/m      Gen: alert, active, attentive, appropriately groomed   HEENT: normocephalic, eyes open with no discharge  Psych: mood stable     NEURO:  CN:  II:  VFF.  III, IV, VI: EOM normal range, no nystagmus.  Normal saccades.  V:  Facial sensation intact.  VII: Face symmetric at rest and with activation  VIII: Intact to finger rub bilaterally.  IX, X: Palate rise b/l, uvula midline.  No dysarthria.  XI: Trapezius and SCM 5/5 bilaterally.  XII: Tongue protrudes midline. No fasciculation or atrophy noted.    Motor:  Normal bulk and tone throughout upper and lower extremities. See TETRAS below.  R/L  Shoulder abd      5/5  Elbow flex  5/5  Elbow ext  5/5     5/5    Hip flex   5/5  Knee flex  5/5  Knee ext  5/5  Dorsiflex  5/5  Plantar flex  5/5    Reflexes:  R/L  Biceps   1+/2+  Patellar  2+/1+  Achilles  1/1  No clonus.    Sensation:  Intact to LT in all extremities.    Coordination:  FTN and HTN intact bilaterally.    Gait:  Unsteady on quick pivot turn, narrow based.  Romberg negative.      TETRAS:  Motor (Performance) Sub-Scale 10/3/2022   Assessment Time 8:20 AM   Medication On   DBS - Right Brain None   DBS - Left Brain None   Head 1.5   Face & Jaw 0   Voice 2 "   Outstretched - RIGHT 1.5   Outstretched - LEFT 1.5   Wingbeating - RIGHT 2.5   Wingbeating - LEFT 2   Kinetic - RIGHT 1.5   Kinetic - LEFT 2   Lower Limb - RIGHT 0   Lower Limb - LEFT 2   Lower Limb (Max) 2   Spiral - RIGHT 4   Spiral - LEFT 3   Handwriting 4   Dot approx - RIGHT 2   Dot approx - LEFT 2.5   Trunk (Standing) 2   Total Right 11.5   Total Left 13   Axial 3.5   TOTAL 34           ASSESSMENT/PLAN:  Mr. Diego is an 81 yo right handed man who presents for treatment of ET.  He did not tolerate propranolol due to hypotension and, thus far, primidone has not been effective enough in controlling his tremor.  We discussed the options of increasing primidone, topiramate, and gabapentin.  We also discussed DBS therapy.  He is interested in beginning the evaluation process while we increase his primidone further.    - Increase primidone to 100mg BID as tolerated  - Neuropsych and neurosurgery referrals placed  - 3T MRI ordered      Sada Matt MD    Movement Disorders Division  Department of Neurology      53 minutes spent on the date of the encounter doing chart review, history and exam, documentation and further activities as noted above.

## 2022-10-03 NOTE — LETTER
October 4, 2022    RE: Mk Diego  [address] [address]    Dr. Raheem Coley  [facility]OLOG[facility]   [address] [address]    Dear Dr. Coley,    We have received your Deep Brain Stimulation referral for Mk. Thank you for providing us with the opportunity to partner with you in the care of Mk. Mk was scheduled to see Dr. Sada Matt on 10/3/22. Mk has decided to proceed with Deep Brain Stimulation work-up.    Once all testing is completed and the DBS Consensus group has met and discussed Mk, Katy Alberts RN Deep Brain Stimulation , will send you the final decision. Please give me a call if you have any questions regarding the process.    Best Regards,  Nba Logan CMA  DBS Care Coordinator  [facility]    [facility]  [facility]  [address] [place] [address]  Clinic Phone: [phone]  Fax: [phone]

## 2022-10-03 NOTE — TELEPHONE ENCOUNTER
Called the patient to get them scheduled for their DBS workup, for Essential Tremor.    Writer left a message for the patient to call back.

## 2022-10-03 NOTE — PATIENT INSTRUCTIONS
Primidone:  Week 1: Take 1/2 pill in the morning, and 1 pill at night  Week 2: Take 1 pill twice per day (morning and night)  Week 3: Take 1 pill in the morning and 1.5 pills at night  Week 4: Take 1.5 pills twice per day.  Week 5: Take 1.5 pills in the morning and 2 pills at night.  Week 6: Take 2 pills twice per day.      --------------------------------------------------------------------------------------------------   [employer] Education Video    If the above link does not work, cut and paste this address into your browser    [email or web address]    ----------------------------------------------------------------------------------------------------------------------------  M [employer] Handbook    Try copying and pasting this url if the link above does not work:     [email or web address]    ------------------------------------------------------------------------------------------------------    The following 4 steps are part of a deep brain stimulation (DBS) workup to see if you are candidate for DBS. The workup will consist of the following series of tests or evaluations, not necessarily in this order.     MRI brain with and without contrast.  This is a study to look at the structure of your brain.  Please alert us if you have an allergy to MRI dye (gadolinium), claustrophobia, if you have a medical device such as a pacemaker, port or PICC line. If you need an oral medication to help with claustrophobia or anxiety, please wait until you get your MRI appointment to take your medication. You will be instructed on when to take the medication by the MRI technician. Please also make sure you have a  to take you to your MRI appointment if you need oral sedation medication.     Memory and cognitive testing (Neuropsychological Evaluation) to make sure you can tolerate the surgery and participate in it.     If patients are on  anticholinergic medications (trihexyphenidyl or benztropine), they will need to taper off the medication prior to the neuropsychological evaluation. Side effects of the medication can cause inaccurate results. You will receive instructions on how to taper off the medication.    Videotaping session with rating scales (Motor Testing)    This is an in-person session with our DBS specialists who will video tape you performing certain tasks. We want to see your symptoms.    Call our DBS nurses at [phone], if you have questions about your medication.  Failure to follow these instructions may require us to cancel this appointment which will delay your workup.     Neurosurgery consultation    You will meet with one of our expert DBS neurosurgeons to discuss your potential DBS procedure.      Our DBS Care Coordinator will contact you to set up the appointments listed above. After your 4 appointments are completed, your results will be discussed with our entire team at the DBS Consensus Conference.  You should hear from us about the results of that conference within 1 week after that discussion.  If you have questions, please call our DBS nurses at [phone].

## 2022-10-04 NOTE — TELEPHONE ENCOUNTER
The patient's daughter, Kathi called the writer back to schedule the patient's work-up appointments.    The patient's daughter, Kathi, was informed of [facility] - work up. The patient's daughter stated she would like to sign the patient up for [facility]-Work up. A future message was sent to sign the patient up for [facility] closer to the work-up appointments.    A list of the patient's Deep Brain Stimulation work up appointments was sent to them via [employer].

## 2022-10-14 ENCOUNTER — DOCUMENTATION ONLY (OUTPATIENT)
Dept: NEUROLOGY | Facility: CLINIC | Age: 82
End: 2022-10-14

## 2022-10-14 DIAGNOSIS — G25.0 ESSENTIAL TREMOR: Primary | ICD-10-CM

## 2022-10-18 ENCOUNTER — OFFICE VISIT (OUTPATIENT)
Dept: NEUROPSYCHOLOGY | Facility: CLINIC | Age: 82
End: 2022-10-18
Payer: COMMERCIAL

## 2022-10-18 DIAGNOSIS — G25.0 ESSENTIAL TREMOR: ICD-10-CM

## 2022-10-18 DIAGNOSIS — F09 MENTAL DISORDER DUE TO GENERAL MEDICAL CONDITION: Primary | ICD-10-CM

## 2022-10-18 PROCEDURE — 96139 PSYCL/NRPSYC TST TECH EA: CPT

## 2022-10-18 PROCEDURE — 96133 NRPSYC TST EVAL PHYS/QHP EA: CPT

## 2022-10-18 PROCEDURE — 96138 PSYCL/NRPSYC TECH 1ST: CPT

## 2022-10-18 PROCEDURE — 96132 NRPSYC TST EVAL PHYS/QHP 1ST: CPT

## 2022-10-18 PROCEDURE — 90791 PSYCH DIAGNOSTIC EVALUATION: CPT
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
## 2022-10-19 NOTE — NURSING NOTE
Pt was seen for neuropsychological evaluation at the request of Dr. Sada Matt for the purposes of diagnostic clarification and treatment planning. 210 minutes of test administration and scoring were provided by this writer. Please see Dr. Aster Green's report for a full interpretation of the findings.    Kaleb Epstein MA, CSP

## 2022-10-23 NOTE — CONFIDENTIAL NOTE
Patient Mk Diego 10/18/22    MRN [id number]  Provider      [birth year]   Tech SH    Sex Male   Occupation     Education 13   Language     Preferred Hand Ambidextrous  Station OP    Age 82                 DEMENTIA RATING SCALE - 2   TEST FORM Standard     Raw MAS       Attention 33 8       Init/Psv 37 12       Construct 6 10       Concept 37 11       Memory 20 5       Total / 144 133 9                WAIS-IV          Raw SS       Comprehension 21 10       Letter Num Seq 14 7       Digit Span 17 7       Matrix Reasoning 10 10                WIDE RANGE ACHIEVEMENT TEST    TEST FORM 5     Raw SS %ile Grade Eq.     Reading 55 92 30 10.2              WECHSLER MEMORY SCALE - IV         Raw SS/%ile       Info/Orientation 13        Logical Memory I 30 11       Logical Memory II 17 11       LM Recognition  21 >75                [place] NAMING TEST         Score (Correct+Stim Cue)  51 MAS 10     # Correct Stimulus Cue  0 T 42     # Correct Phonemic Cue  3                D-KEFS VERBAL FLUENCY    TEST FORM Standard     Raw SS       Letter Fluency 15 5       Category Fluency 23 7       Switching Fluency             Total Correct 8 7       Switching Accuracy 9 10                CLOCK DRAWING         Command NORMAL /3 Rating       Copy BORDERLINE /3 Rating               STROOP          Raw T MAS      Word 56 20 6      Color  25 12 4      Color/Word 10 30 4               DIGGS JUDGMENT OF LINE ORIENTATION  TEST FORM H    Raw Score 17 Raw Correction 20     MAS 8 Diggs %ile 20              HVLT-R    TEST FORM 1     Raw T       Trial 1 6        Trial 2 9        Trial 3 10        Total 1-3 25 54       Learning 4        Delay 10 57       Percent Retained 100% 57       True Positives 12        Discrimination Index 11 56       False Positives 1                 BDI         Score 7        Interpretation MINIMAL                 APATHY SCALE         Score 13                 QUESTIONNAIRES         ESS         RDBSQ         QUEST

## 2022-11-07 NOTE — TELEPHONE ENCOUNTER
FUTURE VISIT INFORMATION      FUTURE VISIT INFORMATION:    Date: 11/14/2022    Time: 1115am     Location: [facility] [place]  REFERRAL INFORMATION:    Referring provider:  Dr. Matt     Referring providers clinic:  [facility]     Reason for visit/diagnosis  DBS     RECORDS REQUESTED FROM:       Clinic name Comments Records Status Imaging Status   Internal Dr. Matt-10/3/2022    MR Brain-11/14/2022 Epic PACS         [facility] PACS

## 2022-11-08 ENCOUNTER — TELEPHONE (OUTPATIENT)
Dept: NEUROLOGY | Facility: CLINIC | Age: 82
End: 2022-11-08

## 2022-11-08 NOTE — TELEPHONE ENCOUNTER
"                                                      Deep Brain Stimulation Surgery Surgical Candidacy Form    Referring Provider: Raheem Coley MD   Patient Information: Lives in [place], [place]  Name: Mk Diego  YOB: 1940  Age: 82 year old  Height: 5'5\"  Weight: 187 lbs  BMI: 31.27  Blood Pressure: 147/70  Diagnosis: Essential tremor  Age of Onset of Symptoms: mid 50's. Year of Onset of Symptoms: late 1990s.  Age of Diagnosis: 67. Year of Diagnosis: 2007.  Handedness: Right.  Side of Onset: Right upper extremity., Left upper extremity.   Disease Features: Tremor     Surgery Goals: Decrease tremor. and Other: To put a nut on a bolt, pour powder into a shell case, eat without spilling     Medications: As of 11/8/22:  Current Outpatient Medications   Medication Sig Dispense Refill     Acetylcysteine 600 MG TBCR        acyclovir (ZOVIRAX) 400 MG tablet Take 400 mg by mouth 2 times daily       atorvastatin (LIPITOR) 40 MG tablet Take 40 mg by mouth daily       betamethasone dipropionate (DIPROSONE) 0.05 % external cream Apply topically 2 times daily as needed       brinzolamide-brimonidine (SIMBRINZA) 1-0.2 % ophthalmic suspension Apply 1 drop to eye 2 times daily       buPROPion (WELLBUTRIN XL) 300 MG 24 hr tablet Take 300 mg by mouth daily       cholecalciferol 25 MCG (1000 UT) TABS Take 1,000 Units by mouth daily       famotidine (PEPCID) 20 MG tablet 20mg       ferrous sulfate (FEROSUL) 325 (65 Fe) MG tablet Take 325 mg by mouth daily       fluocinonide (LIDEX) 0.05 % external ointment Apply 1 Application topically 2 times daily       FLUoxetine (PROZAC) 40 MG capsule Take 1 capsule by mouth daily       fluticasone (FLONASE) 50 MCG/ACT nasal spray Spray 1-2 sprays in nostril daily as needed       latanoprost (XALATAN) 0.005 % ophthalmic solution [LATANOPROST (XALATAN) 0.005 % OPHTHALMIC SOLUTION] Administer 1 drop to both eyes bedtime.       LENalidomide (REVLIMID) 10 MG CAPS capsule Take " 10 mg by mouth daily On day 1-21 of each 28 day cycle       loperamide (IMODIUM) 2 MG capsule Take 4mg by mouth with 1st loose stool, then 2mg with each subsequent loose stool. Max 16 mg in 24 hrs       midodrine (PROAMATINE) 2.5 MG tablet 15mg in the morning and 12.5mg in the evening       Multiple Vitamins-Minerals (DAILY MULTIVITAMIN) CAPS        nitroGLYcerin (NITROSTAT) 0.4 MG sublingual tablet Place 0.4 mg under the tongue as needed       ondansetron (ZOFRAN ODT) 4 MG ODT tab Take 1 tablet by mouth every 8 hours as needed       primidone (MYSOLINE) 50 MG tablet Increase as instructed to 2 pills twice per day. 120 tablet 11     Probiotic Product (PROBIOTIC PO) Take 1 capsule by mouth       prochlorperazine (COMPAZINE) 10 MG tablet Take 10 mg by mouth       sulfamethoxazole-trimethoprim (BACTRIM DS) 800-160 MG tablet Take 1 tablet by mouth Every Monday, Wednesday, and Friday       vitamin B-12 (CYANOCOBALAMIN) 1000 MCG tablet Take 1,000 mcg by mouth daily       zinc sulfate (ZINCATE) 220 (50 Zn) MG capsule Take 220 mg by mouth daily         Motor Assessment:  TETRAS: 38.5  Motor (Performance) Sub-Scale 11/14/2022   Assessment Time 12:54 PM   Medication On   DBS - Right Brain None   DBS - Left Brain None   Head 2   Face & Jaw 2   Voice 1   Outstretched - RIGHT 2.5   Outstretched - LEFT 2   Wingbeating - RIGHT 3   Wingbeating - LEFT 3.5   Kinetic - RIGHT 2   Kinetic - LEFT 2   Lower Limb - RIGHT 2   Lower Limb - LEFT 2   Lower Limb (Max) 2   Spiral - RIGHT 3   Spiral - LEFT 2.5   Handwriting 4   Dot approx - RIGHT 3.5   Dot approx - LEFT 2.5   Trunk (Standing) 1   Total Right 16   Total Left 14.5   Axial 5   TOTAL 38.5        Neuropsychological Evaluation:    Cognitive Issues: Results indicate slowed information processing speed, with performance otherwise falling within normal limits across cognitive domains.     Psychiatric Issues: He has a history of anxiety and depression, but endorses minimal depressive  symptomatology, anxiety, or apathy currently. He stopped drinking alcohol in . He noted that his activities of have been limited by fatigue, and he has been treated for multiple myeloma and is on maintenance therapy of lenalidomide. There was some indication of abnormal thought processes.  Specifically, when asked about the risks of the surgery, he stated that he understood that death is a risk but that he has  before.  He elaborated, explaining that during World War II he was on a ship that was bombed and went down, and then they were going through a jungle and he saw a brilliant white figure who he took to be Cele.  It is unclear whether this was a dream, and he acknowledged during the interview that he was born in 1940.  There was no other indication of a thought disorder, including no hallucinations or delusions.  He does not appear to be experiencing emotional or psychiatric symptoms that would affect his judgment or ability to follow through treatment recommendations.  Overall, from a neurocognitive perspective, he appears to be an adequate candidate for surgery.    Depression: No depression.    Cognitive Function: No signs or symptoms of cognitive dysfunction. (slowed processing speed)    Psychosis: No hallucinations.     MRI Date: 22    Impression:   1. No acute intracranial pathology.  2. No focal infiltrate lesions. Enlarged perivascular channels  throughout the white matter.  3. Multiple foci of susceptibility in the right temporal lobe (series  12, image 40). These may represent remote microhemorrhages.     PMH: -  Past Medical History:   Diagnosis Date     BPH (benign prostatic hyperplasia)      Chronic gastritis      Chronic kidney disease, stage 3a (H)      Coronary arteriosclerosis      ED (erectile dysfunction)      Hip arthritis      Hypotension      Iron deficiency anemia      Major depressive disorder      MGUS (monoclonal gammopathy of unknown significance)      Multiple myeloma  (H)      Normocytic normochromic anemia      Primary insomnia      Seborrheic dermatitis      Tremor 2022     Vitamin D deficiency      PSH  Past Surgical History:   Procedure Laterality Date     ARTHRODESIS WRIST Bilateral     x2 right , then x1 on left prior to the      CV PCI       HC REPAIR INTERCARP/CARP-METACARP JT Left 2014    Procedure: CARPAL METACARPAL ARTHROPLASTY THUMB;  Surgeon: Nomi Judd MD;  Location: [facility];  Service: Orthopedics     LAPAROSCOPIC CHOLECYSTECTOMY       OTHER SURGICAL HISTORY      paraesophageal hiatal hernia repair     ZZC FUSION INTERCARPAL Left 2016    Procedure: LEFT ARTHRODESIS REVISION LEFT WRIST WITH ILIAC CREST BONE GRAFT;  Surgeon: Nomi Judd MD;  Location: [facility];  Service: Orthopedics     Soc Hx: history of alcohol abuse, sober since . Former smoker. Retired, previously .    Family Hx of Movement Disorders: 2 brothers and one of their children have a tremor    Consult Pollo Matt/Uzma   Patient discussed at conference on: 22     DBS Meeting Notes/Further Work-up Needed: -    Authorization to discuss Protected Health Information:  None on file  Healthcare Directive:  None on file  Mychart use:  Uses reliably  Get Well Loop sign up: Yes.  If yes, was the patient engaged? Yes.    From 22 Consensus meetin. Candidate - Too risky surgically = No - FUS better or Tayler Trio  Increased risk of intracranial hemorrhage secondary to the atrophy, also making DBS implantation more difficult and more risky. Delayed SDH risk. Atrophy - brain shift - mapping challenging.  2. The plan for surgery - no  3. The  - NA  4. The motor symptoms we are treating include - tremor  5. Patient goal: To put a nut on a bolt, pour powder into a shell case, eat without spilling     Do we have all the imaging sequences needed? Yes  -----------------  Spoke to patient's daughter, Kathi. Explained that patient is  "not a candidate but there are alternatives. Will call patient next.     Patient stated, \"I'm almost relieved. I have been tossing and turning about this.\"     Writer told him about the options of a focused ultrasound at [facility] or the Tayler trio wrist device. He would like a prescription for the Tayler Trio. Writer will send him information about it and notify Dr. Matt. No further questions or concerns.     "

## 2022-11-08 NOTE — PROGRESS NOTES
"Department of Neurology  Movement Disorders Division   Motor Testing Note    Patient: Mk Diego   MRN: [id number]   : 1940   Date of Visit: 2022    INTERVAL EVENTS:  Mr. Diego is an 81 yo right handed man who presents for motor testing as a part of his DBS evaluation.  Accompanied by his wife and daughter.  His tremor has been present for at least 20 years but has worsened over the past 10 years.  It is worse in his right hand.  Recently started to notice head tremor while walking.  While resting and standing, his legs \"begin to jump\".  His two brothers have it as well.He has a lot of trouble doing anything fine with is hands.  He can no longer reload his guns, work as a , or eat.  His balance is poor.  He is falling daily but has been able to catch himself.  His balance is worse when walking slowing.    Sx onset: in the late     Dx: 15 years ago    Prior meds: propranolol (hypotension), primidone (imbalance, sleepiness, \"foggy\" head)    Side: Right hand    IPG: Prefers non-rechargeable    Goal: To put a nut on a bolt, pour powder into a shell case, eat without spilling    FH: His brothers have tremor.  One of their children has tremor as well.         Tremor ADL Scale  1. Speaking 1 (1) Slight voice tremulousness, only when \"nervous\".   2. Feeding with a spoon 3 (3) Moderately abnormal. Spills a lot or changes strategy to complete task such as using two hands or leaning over.   3. Drinking from a glass 4 (4) Severely abnormal. Cannot drink from a glass or uses straw or sippy cup.   4. Hygiene 3 (3) Moderately abnormal. Unable to do most fine tasks such as putting on lipstick or shaving unless changes strategy such as using two hands or using the less affected hand.   5. Dressing 2 (2) Mildly abnormal. Able to do everything but has difficulty due to tremor.   6. Pouring 3 (3) Moderately abnormal. Must use two hands or uses other strategies to avoid spilling.   7. Carrying food " trays, plates or similar items 3 (3) Moderately abnormal. Uses strategies such as holding tightly against body to carry.   8. Using keys 2 (2) Mildly abnormal. Commonly misses target but still routinely puts key in lock with one hand.   9. Writing 4 (4) Severely abnormal. Cannot write.   10. Working 3 (3) Moderately abnormal. Unable to continue working without using strategies such as changing jobs or using special equipment.   11. Overall disability with the most affected task 4 (4) Severely abnormal. Cannot do the task.   Name of most affected task Shooting Shooting   12. Social impact 1 (1) Aware of tremor, but it does not affect lifestyle or professional life.   ADL TOTAL 33    Prior 31 on 10/3/2022      Related Medications Afternoon Evening   Primidone 50mg 0 1   Last taken last night    ROS:  All others negative except as listed above.    Past Medical History:   Diagnosis Date     BPH (benign prostatic hyperplasia)      Chronic gastritis      Chronic kidney disease, stage 3a (H)      Coronary arteriosclerosis      ED (erectile dysfunction)      Hip arthritis      Hypotension      Iron deficiency anemia      Major depressive disorder      MGUS (monoclonal gammopathy of unknown significance)      Multiple myeloma (H)      Normocytic normochromic anemia      Primary insomnia      Seborrheic dermatitis      Tremor 05/03/2022     Vitamin D deficiency         Past Surgical History:   Procedure Laterality Date     ARTHRODESIS WRIST Bilateral     x2 right , then x1 on left prior to the 2014     CV PCI       HC REPAIR INTERCARP/CARP-METACARP JT Left 07/01/2014    Procedure: CARPAL METACARPAL ARTHROPLASTY THUMB;  Surgeon: Nomi Judd MD;  Location: [facility];  Service: Orthopedics     LAPAROSCOPIC CHOLECYSTECTOMY       OTHER SURGICAL HISTORY      paraesophageal hiatal hernia repair     ZZC FUSION INTERCARPAL Left 08/09/2016    Procedure: LEFT ARTHRODESIS REVISION LEFT WRIST WITH ILIAC CREST BONE GRAFT;   Surgeon: Nomi Judd MD;  Location: [facility];  Service: Orthopedics        Current Outpatient Medications   Medication Sig Dispense Refill     Acetylcysteine 600 MG TBCR        acyclovir (ZOVIRAX) 400 MG tablet Take 400 mg by mouth 2 times daily       Aspirin (VAZALORE) 81 MG CAPS        atorvastatin (LIPITOR) 40 MG tablet Take 40 mg by mouth daily       betamethasone dipropionate (DIPROSONE) 0.05 % external cream Apply topically 2 times daily as needed       brinzolamide-brimonidine (SIMBRINZA) 1-0.2 % ophthalmic suspension Apply 1 drop to eye 2 times daily       buPROPion (WELLBUTRIN XL) 300 MG 24 hr tablet Take 300 mg by mouth daily       cholecalciferol 25 MCG (1000 UT) TABS Take 1,000 Units by mouth daily       famotidine (PEPCID) 20 MG tablet 20mg       ferrous sulfate (FEROSUL) 325 (65 Fe) MG tablet Take 325 mg by mouth daily       fluocinonide (LIDEX) 0.05 % external ointment Apply 1 Application topically 2 times daily       FLUoxetine (PROZAC) 40 MG capsule Take 1 capsule by mouth daily       fluticasone (FLONASE) 50 MCG/ACT nasal spray Spray 1-2 sprays in nostril daily as needed       latanoprost (XALATAN) 0.005 % ophthalmic solution [LATANOPROST (XALATAN) 0.005 % OPHTHALMIC SOLUTION] Administer 1 drop to both eyes bedtime.       LENalidomide (REVLIMID) 10 MG CAPS capsule Take 10 mg by mouth daily On day 1-21 of each 28 day cycle       loperamide (IMODIUM) 2 MG capsule Take 4mg by mouth with 1st loose stool, then 2mg with each subsequent loose stool. Max 16 mg in 24 hrs       midodrine (PROAMATINE) 2.5 MG tablet 15mg in the morning and 12.5mg in the evening       Multiple Vitamins-Minerals (DAILY MULTIVITAMIN) CAPS        nitroGLYcerin (NITROSTAT) 0.4 MG sublingual tablet Place 0.4 mg under the tongue as needed       ondansetron (ZOFRAN ODT) 4 MG ODT tab Take 1 tablet by mouth every 8 hours as needed       primidone (MYSOLINE) 50 MG tablet Increase as instructed to 2 pills twice per day. 120  tablet 11     Probiotic Product (PROBIOTIC PO) Take 1 capsule by mouth       prochlorperazine (COMPAZINE) 10 MG tablet Take 10 mg by mouth       sulfamethoxazole-trimethoprim (BACTRIM DS) 800-160 MG tablet Take 1 tablet by mouth Every Monday, Wednesday, and Friday       vitamin B-12 (CYANOCOBALAMIN) 1000 MCG tablet Take 1,000 mcg by mouth daily       zinc sulfate (ZINCATE) 220 (50 Zn) MG capsule Take 220 mg by mouth daily         Allergies   Allergen Reactions     Codeine Nausea and Vomiting and Nausea     Patient reports that he has tolerated morphine in the past  Patient reports that he has tolerated morphine in the past       Demerol [Meperidine] Nausea and Vomiting     Fentanyl Nausea and Vomiting     Prilocaine Hives and Unknown     Afluria Preservative Free Nausea and Vomiting     Anesthetics, Amide [Amides]      Dry heaves     Lidocaine Unknown and Other (See Comments)     Dry heaves  Dry heaves     Morphine Nausea and Nausea and Vomiting     Tamsulosin Other (See Comments)     Other reaction(s): Hypotension        Family History   Problem Relation Age of Onset     Osteoporosis Mother      Depression Father      Tremor Brother      Tremor Brother      Cancer Maternal Grandfather      Tremor Niece         Social History     Socioeconomic History     Marital status:      Spouse name: Not on file     Number of children: Not on file     Years of education: Not on file     Highest education level: Not on file   Occupational History     Not on file   Tobacco Use     Smoking status: Former     Packs/day: 4.00     Years: 7.00     Pack years: 28.00     Types: Cigarettes     Quit date: 1968     Years since quittin.4     Smokeless tobacco: Never   Substance and Sexual Activity     Alcohol use: No     Comment: Alcoholic Drinks/day: quit      Drug use: No     Sexual activity: Not on file   Other Topics Concern     Not on file   Social History Narrative     Not on file     Social Determinants of Health      Financial Resource Strain: Not on file   Food Insecurity: Not on file   Transportation Needs: Not on file   Physical Activity: Not on file   Stress: Not on file   Social Connections: Not on file   Intimate Partner Violence: Not on file   Housing Stability: Not on file        PHYSICAL EXAM:  88, 122/73, 16  Motor (Performance) Sub-Scale 11/14/2022   Assessment Time 12:54 PM   Medication On   DBS - Right Brain None   DBS - Left Brain None   Head 2   Face & Jaw 2   Voice 1   Outstretched - RIGHT 2.5   Outstretched - LEFT 2   Wingbeating - RIGHT 3   Wingbeating - LEFT 3.5   Kinetic - RIGHT 2   Kinetic - LEFT 2   Lower Limb - RIGHT 2   Lower Limb - LEFT 2   Lower Limb (Max) 2   Spiral - RIGHT 3   Spiral - LEFT 2.5   Handwriting 4   Dot approx - RIGHT 3.5   Dot approx - LEFT 2.5   Trunk (Standing) 1   Total Right 16   Total Left 14.5   Axial 5   TOTAL 38.5         ASSESSMENT/PLAN:  Mr. Diego is an 83 yo right handed man who presents for motor testing as a part of his DBS evaluation.  He has completed his evaluation.  He reports a history of hyperglycemia but I could not find any prior hemoglobin A1Cs.        Sada Matt MD    Movement Disorders Division  Department of Neurology      44 minutes spent on the date of the encounter doing chart review, history and exam, documentation and further activities as noted above.

## 2022-11-14 ENCOUNTER — OFFICE VISIT (OUTPATIENT)
Dept: NEUROLOGY | Facility: CLINIC | Age: 82
End: 2022-11-14
Payer: COMMERCIAL

## 2022-11-14 ENCOUNTER — ANCILLARY PROCEDURE (OUTPATIENT)
Dept: MRI IMAGING | Facility: CLINIC | Age: 82
End: 2022-11-14
Attending: STUDENT IN AN ORGANIZED HEALTH CARE EDUCATION/TRAINING PROGRAM
Payer: COMMERCIAL

## 2022-11-14 ENCOUNTER — PRE VISIT (OUTPATIENT)
Dept: NEUROSURGERY | Facility: CLINIC | Age: 82
End: 2022-11-14

## 2022-11-14 ENCOUNTER — OFFICE VISIT (OUTPATIENT)
Dept: NEUROSURGERY | Facility: CLINIC | Age: 82
End: 2022-11-14
Payer: COMMERCIAL

## 2022-11-14 VITALS
SYSTOLIC BLOOD PRESSURE: 122 MMHG | HEART RATE: 88 BPM | DIASTOLIC BLOOD PRESSURE: 73 MMHG | OXYGEN SATURATION: 98 % | RESPIRATION RATE: 16 BRPM

## 2022-11-14 DIAGNOSIS — G25.0 ESSENTIAL TREMOR: ICD-10-CM

## 2022-11-14 DIAGNOSIS — I25.119 CORONARY ARTERY DISEASE INVOLVING NATIVE HEART WITH ANGINA PECTORIS, UNSPECIFIED VESSEL OR LESION TYPE (H): ICD-10-CM

## 2022-11-14 DIAGNOSIS — D69.6 THROMBOCYTOPENIA (H): ICD-10-CM

## 2022-11-14 DIAGNOSIS — N18.31 STAGE 3A CHRONIC KIDNEY DISEASE (H): ICD-10-CM

## 2022-11-14 DIAGNOSIS — F32.1 MAJOR DEPRESSIVE DISORDER, SINGLE EPISODE, MODERATE (H): Primary | ICD-10-CM

## 2022-11-14 DIAGNOSIS — G25.0 ESSENTIAL TREMOR: Primary | ICD-10-CM

## 2022-11-14 DIAGNOSIS — C90.00 MULTIPLE MYELOMA, REMISSION STATUS UNSPECIFIED (H): ICD-10-CM

## 2022-11-14 PROCEDURE — 99205 OFFICE O/P NEW HI 60 MIN: CPT | Performed by: NEUROLOGICAL SURGERY

## 2022-11-14 PROCEDURE — 99215 OFFICE O/P EST HI 40 MIN: CPT | Performed by: STUDENT IN AN ORGANIZED HEALTH CARE EDUCATION/TRAINING PROGRAM

## 2022-11-14 RX ORDER — ASPIRIN 81 MG/81MG
CAPSULE ORAL
COMMUNITY
Start: 2022-10-05

## 2022-11-14 RX ORDER — GADOBUTROL 604.72 MG/ML
0.1 INJECTION INTRAVENOUS ONCE
Status: COMPLETED | OUTPATIENT
Start: 2022-11-14 | End: 2022-11-14

## 2022-11-14 RX ADMIN — GADOBUTROL 8.5 ML: 604.72 INJECTION INTRAVENOUS at 08:42

## 2022-11-14 ASSESSMENT — ACTIVITIES OF DAILY LIVING (ADL)
SOCIAL_IMPACT: (1) AWARE OF TREMOR, BUT IT DOES NOT AFFECT LIFESTYLE OR PROFESSIONAL LIFE.
HYGIENE: (3) MODERATELY ABNORMAL. UNABLE TO DO MOST FINE TASKS SUCH AS PUTTING ON LIPSTICK OR SHAVING UNLESS CHANGES STRATEGY SUCH AS USING TWO HANDS OR USING THE LESS AFFECTED HAND.
WRITING: (4) SEVERELY ABNORMAL. CANNOT WRITE.
CARRYING_FOOD_TRAYS_PLATES_OR_SIMILAR_ITEMS: (3) MODERATELY ABNORMAL. USES STRATEGIES SUCH AS HOLDING TIGHTLY AGAINST BODY TO CARRY.
WORKING: (3) MODERATELY ABNORMAL. UNABLE TO CONTINUE WORKING WITHOUT USING STRATEGIES SUCH AS CHANGING JOBS OR USING SPECIAL EQUIPMENT.
FEEDING_WITH_A_SPOON: (3) MODERATELY ABNORMAL. SPILLS A LOT OR CHANGES STRATEGY TO COMPLETE TASK SUCH AS USING TWO HANDS OR LEANING OVER.
POURING: (3) MODERATELY ABNORMAL. MUST USE TWO HANDS OR USES OTHER STRATEGIES TO AVOID SPILLING.
DRESS: (2) MILDLY ABNORMAL. ABLE TO DO EVERYTHING BUT HAS DIFFICULTY DUE TO TREMOR.
SPEAKING: (1) SLIGHT VOICE TREMULOUSNESS, ONLY WHEN "NERVOUS".
TOTAL_SCORE: 33
USING_KEYS: (2) MILDLY ABNORMAL. COMMONLY MISSES TARGET BUT STILL ROUTINELY PUTS KEY IN LOCK WITH ONE HAND.
DRINKING_FROM_A_GLASS: (4) SEVERELY ABNORMAL. CANNOT DRINK FROM A GLASS OR USES STRAW OR SIPPY CUP.
OVERALL_DISABILITY_WITH_THE_MOST_AFFECTED_TASK: SHOOTING
OVERALL_DISABILITY_WITH_THE_MOST_AFFECTED_TASK: (4) SEVERELY ABNORMAL. CANNOT DO THE TASK.

## 2022-11-14 ASSESSMENT — PAIN SCALES - GENERAL: PAINLEVEL: SEVERE PAIN (7)

## 2022-11-14 NOTE — PROGRESS NOTES
NEUROSURGERY    HISTORY AND PHYSICAL EXAM    Chief Complaint   Patient presents with     Consult     CONSULT, ESSENTIAL TREMOR, DBS CANDIDACY EVALUATION       HISTORY OF PRESENT ILLNESS  Mr. Mk Osman is a 82 year old right handed male with a history of essential tremor who presents for DBS candidacy evaluation.  He has had tremor for many years and it has been managed with medication.  However, he has noticed that his tremors are not well controlled and it is interfering with certain activities in his life.  Tremor is making it difficult for him to work on cars, reload ammunition and fix things with his hands.  He states that his tremors are worse on his right side but he also has tremor on the left side.      Past Medical History:   Diagnosis Date     BPH (benign prostatic hyperplasia)      Chronic gastritis      Chronic kidney disease, stage 3a (H)      Coronary arteriosclerosis      ED (erectile dysfunction)      Hip arthritis      Hyperglycemia      Hypotension      Iron deficiency anemia      Major depressive disorder      MGUS (monoclonal gammopathy of unknown significance)      Multiple myeloma (H)      Normocytic normochromic anemia      Primary insomnia      Seborrheic dermatitis      Tremor 05/03/2022     Vitamin D deficiency        Past Surgical History:   Procedure Laterality Date     ARTHRODESIS WRIST Bilateral     x2 right , then x1 on left prior to the 2014     CV PCI       HC REPAIR INTERCARP/CARP-METACARP JT Left 07/01/2014    Procedure: CARPAL METACARPAL ARTHROPLASTY THUMB;  Surgeon: Nomi Judd MD;  Location: [facility] OR;  Service: Orthopedics     LAPAROSCOPIC CHOLECYSTECTOMY       OTHER SURGICAL HISTORY      paraesophageal hiatal hernia repair     ZZC FUSION INTERCARPAL Left 08/09/2016    Procedure: LEFT ARTHRODESIS REVISION LEFT WRIST WITH ILIAC CREST BONE GRAFT;  Surgeon: Nomi Judd MD;  Location: [facility] OR;  Service: Orthopedics       Family History   Problem  Relation Age of Onset     Osteoporosis Mother      Depression Father      Tremor Brother      Tremor Brother      Cancer Maternal Grandfather      Tremor Niece        Social History     Socioeconomic History     Marital status:      Spouse name: Not on file     Number of children: Not on file     Years of education: Not on file     Highest education level: Not on file   Occupational History     Not on file   Tobacco Use     Smoking status: Former     Packs/day: 4.00     Years: 7.00     Pack years: 28.00     Types: Cigarettes     Quit date: 1968     Years since quittin.4     Smokeless tobacco: Never   Substance and Sexual Activity     Alcohol use: No     Comment: Alcoholic Drinks/day: quit      Drug use: No     Sexual activity: Not on file   Other Topics Concern     Not on file   Social History Narrative     Not on file     Social Determinants of Health     Financial Resource Strain: Not on file   Food Insecurity: Not on file   Transportation Needs: Not on file   Physical Activity: Not on file   Stress: Not on file   Social Connections: Not on file   Intimate Partner Violence: Not on file   Housing Stability: Not on file          Allergies   Allergen Reactions     Codeine Nausea and Vomiting and Nausea     Patient reports that he has tolerated morphine in the past  Patient reports that he has tolerated morphine in the past       Demerol [Meperidine] Nausea and Vomiting     Fentanyl Nausea and Vomiting     Prilocaine Hives and Unknown     Afluria Preservative Free Nausea and Vomiting     Anesthetics, Amide [Amides]      Dry heaves     Lidocaine Unknown and Other (See Comments)     Dry heaves  Dry heaves     Morphine Nausea and Nausea and Vomiting     Tamsulosin Other (See Comments)     Other reaction(s): Hypotension       Current Outpatient Medications   Medication     Acetylcysteine 600 MG TBCR     acyclovir (ZOVIRAX) 400 MG tablet     Aspirin (VAZALORE) 81 MG CAPS     atorvastatin (LIPITOR) 40  MG tablet     betamethasone dipropionate (DIPROSONE) 0.05 % external cream     brinzolamide-brimonidine (SIMBRINZA) 1-0.2 % ophthalmic suspension     buPROPion (WELLBUTRIN XL) 300 MG 24 hr tablet     cholecalciferol 25 MCG (1000 UT) TABS     famotidine (PEPCID) 20 MG tablet     ferrous sulfate (FEROSUL) 325 (65 Fe) MG tablet     fluocinonide (LIDEX) 0.05 % external ointment     FLUoxetine (PROZAC) 40 MG capsule     fluticasone (FLONASE) 50 MCG/ACT nasal spray     latanoprost (XALATAN) 0.005 % ophthalmic solution     LENalidomide (REVLIMID) 10 MG CAPS capsule     loperamide (IMODIUM) 2 MG capsule     midodrine (PROAMATINE) 2.5 MG tablet     Multiple Vitamins-Minerals (DAILY MULTIVITAMIN) CAPS     nitroGLYcerin (NITROSTAT) 0.4 MG sublingual tablet     ondansetron (ZOFRAN ODT) 4 MG ODT tab     primidone (MYSOLINE) 50 MG tablet     Probiotic Product (PROBIOTIC PO)     prochlorperazine (COMPAZINE) 10 MG tablet     sulfamethoxazole-trimethoprim (BACTRIM DS) 800-160 MG tablet     vitamin B-12 (CYANOCOBALAMIN) 1000 MCG tablet     zinc sulfate (ZINCATE) 220 (50 Zn) MG capsule     No current facility-administered medications for this visit.       REVIEW OF SYSTEMS:  Per HPI      PHYSICAL EXAM  /73   Pulse 88   Resp 16   SpO2 98%     General: Awake, alert, oriented. Well nourished, well developed, no acute distress.  HEENT: Head normocephalic, atraumatic. No carotid bruit. Neck supple. Good range of motion. No palpable thyroid mass.  Heart: Regular rhythm and rate. No murmurs.  Lungs: Clear to auscultation and percussion bilaterally. No rhonchi, rales or wheeze.  Abdomen: Soft, non-tender, non-distended. No hepatosplenomegaly.  Extremity: Warm with no clubbing or cyanosis. No lower extremity edema.    Neurological:  Awake, alert and oriented to date, time, place and person. Speech fluent.   Pupils equal, round, reactive to light.  Extraocular movement intact.  Visual fields are full on confrontation.  Hearing is  grossly normal to finger rub.   Facial sensation intact.  Face symmetric.  Tongue midline.  Uvula elevates equally.    Motor: full strength throughout.  Sensation: intact to light touch and pinpoint.  Deep tendon reflexes: 1+ throughout. Negative for clonus. Negative for Chiang's sign. No dysmetria.      IMAGING  MRI brain without/with gadolinium 11/14/2022  General atrophy with ample subdural space overlying the cortex    IMPRESSION:  1. No acute intracranial pathology.  2. No focal infiltrate lesions. Enlarged perivascular channels throughout the white matter.  3. Multiple foci of susceptibility in the right temporal lobe (series 12, image 40). These may represent remote microhemorrhages.      ASSESSMENT   82 year old right handed male with a history of essential tremor.  Right side worse than left side tremor.    Patient presents for DBS candidacy evaluation.  He has bilateral tremor, worse on the right side, which is interfering with activities that he enjoyed in the past.    We did review his MRI brain which was obtained earlier today.  In terms of DBS surgery, he does have noticeable cerebral atrophy and we discussed the increased risk of intracranial hemorrhage secondary to the atrophy.  We also discussed that this may make the DBS implantation more difficult and more risky.    He also has other underlying comorbidities which may increase his risk of surgery.  This includes his history of thrombocytopenia and cardiac history.    During today's visit, we discussed both phase I and II of DBS surgery.  We discussed that during phase I, we would place the DBS electrode on the left side under MAC and microelectrode recording.  He would then return one week later for the phase II with placement of the DBS generator/battery over the left chest wall under general anesthesia. If he is a unilateral candidate or wait and see strategy candidate, then he will undergo another evaluation/discussion prior to proceeding to  the [facility] side implantation.  If he is a bilateral candidate in a staged fashion, he would return three weeks later for the phase I and II combined for the placement of the DBS electrode on the right side under MAC and microelectrode recording followed by connection to the previously implanted generator/battery.     Briefly, following topic was discussed.    [employer]tronic  MRI compatible.  Non-rechargeable and rechargeable generator/battery available.  Sensing generator/battery available.  4 contact electrode and 8 contact segmented/directional electrode  Communication method: have the  or patient controller on the skin directly over the generator/battery and now wireless  Indications: Parkinson's disease, essential tremor, dystonia, epilepsy and OCD    [employer]  MRI compatible.  Non-rechargeable generator/battery.  8 contact segmented/directional electrode.  Communication method: Wireless/bluetooth.  Indications: Parkinson's disease, essential tremor    [employer]  Rechargeable and non-rechargeable generator/battery  MRI compatible, with certain conditions.  8 contact electrode.  Ring contacts or segmented contacts.  Independent current control at each contact.  Communication method: Wireless.  Indications: Parkinson's disease, essential tremor    I did discuss that the implant technique for these devices are relatively the same which was discussed again.  They all have similar risks associated with the surgery.    He did not have any specific preferences at this time.    Risks, benefits, alternative therapies were discussed with the patient, including but not limited to infection and bleeding (intracranial), injury to the brain, stroke, death, hardware failure and possible need for more surgeries.  Surgical procedure was discussed in detail. All questions were answered, and he expressed understanding.     A full history and physical exam was performed during today's visit.    His case will be discussed  at the Movement Disorder Consensus Group Meeting to determine whether he is a good candidate for DBS surgery.      PLAN  1.  We will discuss his case at the Movement Disorder Consensus Group Meeting, and we will contact him regarding his DBS candidacy.       60 minutes were spent face to face with the patient of which more than 50% of the time was spent counseling and discussing the above issues regarding diagnosis, differential, treatment options, and steps for further evaluation.  5 minutes were spent reviewing patient's chart, imaging in PACS.  15 minutes were spent on documentation for this encounter.  80 minutes total were spent on this encounter today.

## 2022-11-14 NOTE — LETTER
11/14/2022       RE: Mk Diego  [address] [address]     Dear Colleague,    Thank you for referring your patient, Mk Diego, to the [facility] [place] at [facility]. Please see a copy of my visit note below.    NEUROSURGERY    HISTORY AND PHYSICAL EXAM    Chief Complaint   Patient presents with     Consult     CONSULT, ESSENTIAL TREMOR, DBS CANDIDACY EVALUATION       HISTORY OF PRESENT ILLNESS  Mr. Mk Osman is a 82 year old right handed male with a history of essential tremor who presents for DBS candidacy evaluation.  He has had tremor for many years and it has been managed with medication.  However, he has noticed that his tremors are not well controlled and it is interfering with certain activities in his life.  Tremor is making it difficult for him to work on cars, reload ammunition and fix things with his hands.  He states that his tremors are worse on his right side but he also has tremor on the left side.      Past Medical History:   Diagnosis Date     BPH (benign prostatic hyperplasia)      Chronic gastritis      Chronic kidney disease, stage 3a (H)      Coronary arteriosclerosis      ED (erectile dysfunction)      Hip arthritis      Hyperglycemia      Hypotension      Iron deficiency anemia      Major depressive disorder      MGUS (monoclonal gammopathy of unknown significance)      Multiple myeloma (H)      Normocytic normochromic anemia      Primary insomnia      Seborrheic dermatitis      Tremor 05/03/2022     Vitamin D deficiency        Past Surgical History:   Procedure Laterality Date     ARTHRODESIS WRIST Bilateral     x2 right , then x1 on left prior to the 2014     CV PCI       HC REPAIR INTERCARP/CARP-METACARP JT Left 07/01/2014    Procedure: CARPAL METACARPAL ARTHROPLASTY THUMB;  Surgeon: Nomi Judd MD;  Location: [facility];  Service: Orthopedics     LAPAROSCOPIC  CHOLECYSTECTOMY       OTHER SURGICAL HISTORY      paraesophageal hiatal hernia repair     ZZC FUSION INTERCARPAL Left 2016    Procedure: LEFT ARTHRODESIS REVISION LEFT WRIST WITH ILIAC CREST BONE GRAFT;  Surgeon: Nomi Judd MD;  Location: [facility];  Service: Orthopedics       Family History   Problem Relation Age of Onset     Osteoporosis Mother      Depression Father      Tremor Brother      Tremor Brother      Cancer Maternal Grandfather      Tremor Niece        Social History     Socioeconomic History     Marital status:      Spouse name: Not on file     Number of children: Not on file     Years of education: Not on file     Highest education level: Not on file   Occupational History     Not on file   Tobacco Use     Smoking status: Former     Packs/day: 4.00     Years: 7.00     Pack years: 28.00     Types: Cigarettes     Quit date: 1968     Years since quittin.4     Smokeless tobacco: Never   Substance and Sexual Activity     Alcohol use: No     Comment: Alcoholic Drinks/day: quit      Drug use: No     Sexual activity: Not on file   Other Topics Concern     Not on file   Social History Narrative     Not on file     Social Determinants of Health     Financial Resource Strain: Not on file   Food Insecurity: Not on file   Transportation Needs: Not on file   Physical Activity: Not on file   Stress: Not on file   Social Connections: Not on file   Intimate Partner Violence: Not on file   Housing Stability: Not on file          Allergies   Allergen Reactions     Codeine Nausea and Vomiting and Nausea     Patient reports that he has tolerated morphine in the past  Patient reports that he has tolerated morphine in the past       Demerol [Meperidine] Nausea and Vomiting     Fentanyl Nausea and Vomiting     Prilocaine Hives and Unknown     Afluria Preservative Free Nausea and Vomiting     Anesthetics, Amide [Amides]      Dry heaves     Lidocaine Unknown and Other (See Comments)      Dry heaves  Dry heaves     Morphine Nausea and Nausea and Vomiting     Tamsulosin Other (See Comments)     Other reaction(s): Hypotension       Current Outpatient Medications   Medication     Acetylcysteine 600 MG TBCR     acyclovir (ZOVIRAX) 400 MG tablet     Aspirin (VAZALORE) 81 MG CAPS     atorvastatin (LIPITOR) 40 MG tablet     betamethasone dipropionate (DIPROSONE) 0.05 % external cream     brinzolamide-brimonidine (SIMBRINZA) 1-0.2 % ophthalmic suspension     buPROPion (WELLBUTRIN XL) 300 MG 24 hr tablet     cholecalciferol 25 MCG (1000 UT) TABS     famotidine (PEPCID) 20 MG tablet     ferrous sulfate (FEROSUL) 325 (65 Fe) MG tablet     fluocinonide (LIDEX) 0.05 % external ointment     FLUoxetine (PROZAC) 40 MG capsule     fluticasone (FLONASE) 50 MCG/ACT nasal spray     latanoprost (XALATAN) 0.005 % ophthalmic solution     LENalidomide (REVLIMID) 10 MG CAPS capsule     loperamide (IMODIUM) 2 MG capsule     midodrine (PROAMATINE) 2.5 MG tablet     Multiple Vitamins-Minerals (DAILY MULTIVITAMIN) CAPS     nitroGLYcerin (NITROSTAT) 0.4 MG sublingual tablet     ondansetron (ZOFRAN ODT) 4 MG ODT tab     primidone (MYSOLINE) 50 MG tablet     Probiotic Product (PROBIOTIC PO)     prochlorperazine (COMPAZINE) 10 MG tablet     sulfamethoxazole-trimethoprim (BACTRIM DS) 800-160 MG tablet     vitamin B-12 (CYANOCOBALAMIN) 1000 MCG tablet     zinc sulfate (ZINCATE) 220 (50 Zn) MG capsule     No current facility-administered medications for this visit.       REVIEW OF SYSTEMS:  Per HPI      PHYSICAL EXAM  /73   Pulse 88   Resp 16   SpO2 98%     General: Awake, alert, oriented. Well nourished, well developed, no acute distress.  HEENT: Head normocephalic, atraumatic. No carotid bruit. Neck supple. Good range of motion. No palpable thyroid mass.  Heart: Regular rhythm and rate. No murmurs.  Lungs: Clear to auscultation and percussion bilaterally. No rhonchi, rales or wheeze.  Abdomen: Soft, non-tender,  non-distended. No hepatosplenomegaly.  Extremity: Warm with no clubbing or cyanosis. No lower extremity edema.    Neurological:  Awake, alert and oriented to date, time, place and person. Speech fluent.   Pupils equal, round, reactive to light.  Extraocular movement intact.  Visual fields are full on confrontation.  Hearing is grossly normal to finger rub.   Facial sensation intact.  Face symmetric.  Tongue midline.  Uvula elevates equally.    Motor: full strength throughout.  Sensation: intact to light touch and pinpoint.  Deep tendon reflexes: 1+ throughout. Negative for clonus. Negative for Chiang's sign. No dysmetria.      IMAGING  MRI brain without/with gadolinium 11/14/2022  General atrophy with ample subdural space overlying the cortex    IMPRESSION:  1. No acute intracranial pathology.  2. No focal infiltrate lesions. Enlarged perivascular channels throughout the white matter.  3. Multiple foci of susceptibility in the right temporal lobe (series 12, image 40). These may represent remote microhemorrhages.      ASSESSMENT   82 year old right handed male with a history of essential tremor.  Right side worse than left side tremor.    Patient presents for DBS candidacy evaluation.  He has bilateral tremor, worse on the right side, which is interfering with activities that he enjoyed in the past.    We did review his MRI brain which was obtained earlier today.  In terms of DBS surgery, he does have noticeable cerebral atrophy and we discussed the increased risk of intracranial hemorrhage secondary to the atrophy.  We also discussed that this may make the DBS implantation more difficult and more risky.    He also has other underlying comorbidities which may increase his risk of surgery.  This includes his history of thrombocytopenia and cardiac history.    During today's visit, we discussed both phase I and II of DBS surgery.  We discussed that during phase I, we would place the DBS electrode on the left side  under MAC and microelectrode recording.  He would then return one week later for the phase II with placement of the DBS generator/battery over the left chest wall under general anesthesia. If he is a unilateral candidate or wait and see strategy candidate, then he will undergo another evaluation/discussion prior to proceeding to the rigtht side implantation.  If he is a bilateral candidate in a staged fashion, he would return three weeks later for the phase I and II combined for the placement of the DBS electrode on the right side under MAC and microelectrode recording followed by connection to the previously implanted generator/battery.     Briefly, following topic was discussed.    [employer]  MRI compatible.  Non-rechargeable and rechargeable generator/battery available.  Sensing generator/battery available.  4 contact electrode and 8 contact segmented/directional electrode  Communication method: have the  or patient controller on the skin directly over the generator/battery and now wireless  Indications: Parkinson's disease, essential tremor, dystonia, epilepsy and OCD    [employer]  MRI compatible.  Non-rechargeable generator/battery.  8 contact segmented/directional electrode.  Communication method: Wireless/bluetooth.  Indications: Parkinson's disease, essential tremor    [employer]  Rechargeable and non-rechargeable generator/battery  MRI compatible, with certain conditions.  8 contact electrode.  Ring contacts or segmented contacts.  Independent current control at each contact.  Communication method: Wireless.  Indications: Parkinson's disease, essential tremor    I did discuss that the implant technique for these devices are relatively the same which was discussed again.  They all have similar risks associated with the surgery.    He did not have any specific preferences at this time.    Risks, benefits, alternative therapies were discussed with the patient, including but not limited to  infection and bleeding (intracranial), injury to the brain, stroke, death, hardware failure and possible need for more surgeries.  Surgical procedure was discussed in detail. All questions were answered, and he expressed understanding.     A full history and physical exam was performed during today's visit.    His case will be discussed at the Movement Disorder Consensus Group Meeting to determine whether he is a good candidate for DBS surgery.      PLAN  1.  We will discuss his case at the Movement Disorder Consensus Group Meeting, and we will contact him regarding his DBS candidacy.       60 minutes were spent face to face with the patient of which more than 50% of the time was spent counseling and discussing the above issues regarding diagnosis, differential, treatment options, and steps for further evaluation.  5 minutes were spent reviewing patient's chart, imaging in PACS.  15 minutes were spent on documentation for this encounter.  80 minutes total were spent on this encounter today.      Sincerely,    Geraldo Gusman MD

## 2022-11-14 NOTE — LETTER
"2022       RE: Mk Diego  [address] [address]     Dear Colleague,    Thank you for referring your patient, Mk Diego, to the [facility] [place] at [facility]. Please see a copy of my visit note below.    Department of Neurology  Movement Disorders Division   Motor Testing Note    Patient: Mk Diego   MRN: [id number]   : 1940   Date of Visit: 2022    INTERVAL EVENTS:  Mr. Diego is an 81 yo right handed man who presents for motor testing as a part of his DBS evaluation.  Accompanied by his wife and daughter.  His tremor has been present for at least 20 years but has worsened over the past 10 years.  It is worse in his right hand.  Recently started to notice head tremor while walking.  While resting and standing, his legs \"begin to jump\".  His two brothers have it as well.He has a lot of trouble doing anything fine with is hands.  He can no longer reload his guns, work as a , or eat.  His balance is poor.  He is falling daily but has been able to catch himself.  His balance is worse when walking slowing.    Sx onset: in the late     Dx: 15 years ago    Prior meds: propranolol (hypotension), primidone (imbalance, sleepiness, \"foggy\" head)    Side: Right hand    IPG: Prefers non-rechargeable    Goal: To put a nut on a bolt, pour powder into a shell case, eat without spilling    FH: His brothers have tremor.  One of their children has tremor as well.         Tremor ADL Scale  1. Speaking 1 (1) Slight voice tremulousness, only when \"nervous\".   2. Feeding with a spoon 3 (3) Moderately abnormal. Spills a lot or changes strategy to complete task such as using two hands or leaning over.   3. Drinking from a glass 4 (4) Severely abnormal. Cannot drink from a glass or uses straw or sippy cup.   4. Hygiene 3 (3) Moderately abnormal. Unable to do most fine tasks such as putting on lipstick " or shaving unless changes strategy such as using two hands or using the less affected hand.   5. Dressing 2 (2) Mildly abnormal. Able to do everything but has difficulty due to tremor.   6. Pouring 3 (3) Moderately abnormal. Must use two hands or uses other strategies to avoid spilling.   7. Carrying food trays, plates or similar items 3 (3) Moderately abnormal. Uses strategies such as holding tightly against body to carry.   8. Using keys 2 (2) Mildly abnormal. Commonly misses target but still routinely puts key in lock with one hand.   9. Writing 4 (4) Severely abnormal. Cannot write.   10. Working 3 (3) Moderately abnormal. Unable to continue working without using strategies such as changing jobs or using special equipment.   11. Overall disability with the most affected task 4 (4) Severely abnormal. Cannot do the task.   Name of most affected task Shooting Shooting   12. Social impact 1 (1) Aware of tremor, but it does not affect lifestyle or professional life.   ADL TOTAL 33    Prior 31 on 10/3/2022      Related Medications Afternoon Evening   Primidone 50mg 0 1   Last taken last night    ROS:  All others negative except as listed above.    Past Medical History:   Diagnosis Date     BPH (benign prostatic hyperplasia)      Chronic gastritis      Chronic kidney disease, stage 3a (H)      Coronary arteriosclerosis      ED (erectile dysfunction)      Hip arthritis      Hypotension      Iron deficiency anemia      Major depressive disorder      MGUS (monoclonal gammopathy of unknown significance)      Multiple myeloma (H)      Normocytic normochromic anemia      Primary insomnia      Seborrheic dermatitis      Tremor 05/03/2022     Vitamin D deficiency         Past Surgical History:   Procedure Laterality Date     ARTHRODESIS WRIST Bilateral     x2 right , then x1 on left prior to the 2014     CV PCI       HC REPAIR INTERCARP/CARP-METACARP JT Left 07/01/2014    Procedure: CARPAL METACARPAL ARTHROPLASTY THUMB;   Surgeon: Nomi Judd MD;  Location: [facility];  Service: Orthopedics     LAPAROSCOPIC CHOLECYSTECTOMY       OTHER SURGICAL HISTORY      paraesophageal hiatal hernia repair     ZZC FUSION INTERCARPAL Left 08/09/2016    Procedure: LEFT ARTHRODESIS REVISION LEFT WRIST WITH ILIAC CREST BONE GRAFT;  Surgeon: Nomi Judd MD;  Location: [facility];  Service: Orthopedics        Current Outpatient Medications   Medication Sig Dispense Refill     Acetylcysteine 600 MG TBCR        acyclovir (ZOVIRAX) 400 MG tablet Take 400 mg by mouth 2 times daily       Aspirin (VAZALORE) 81 MG CAPS        atorvastatin (LIPITOR) 40 MG tablet Take 40 mg by mouth daily       betamethasone dipropionate (DIPROSONE) 0.05 % external cream Apply topically 2 times daily as needed       brinzolamide-brimonidine (SIMBRINZA) 1-0.2 % ophthalmic suspension Apply 1 drop to eye 2 times daily       buPROPion (WELLBUTRIN XL) 300 MG 24 hr tablet Take 300 mg by mouth daily       cholecalciferol 25 MCG (1000 UT) TABS Take 1,000 Units by mouth daily       famotidine (PEPCID) 20 MG tablet 20mg       ferrous sulfate (FEROSUL) 325 (65 Fe) MG tablet Take 325 mg by mouth daily       fluocinonide (LIDEX) 0.05 % external ointment Apply 1 Application topically 2 times daily       FLUoxetine (PROZAC) 40 MG capsule Take 1 capsule by mouth daily       fluticasone (FLONASE) 50 MCG/ACT nasal spray Spray 1-2 sprays in nostril daily as needed       latanoprost (XALATAN) 0.005 % ophthalmic solution [LATANOPROST (XALATAN) 0.005 % OPHTHALMIC SOLUTION] Administer 1 drop to both eyes bedtime.       LENalidomide (REVLIMID) 10 MG CAPS capsule Take 10 mg by mouth daily On day 1-21 of each 28 day cycle       loperamide (IMODIUM) 2 MG capsule Take 4mg by mouth with 1st loose stool, then 2mg with each subsequent loose stool. Max 16 mg in 24 hrs       midodrine (PROAMATINE) 2.5 MG tablet 15mg in the morning and 12.5mg in the evening       Multiple Vitamins-Minerals  (DAILY MULTIVITAMIN) CAPS        nitroGLYcerin (NITROSTAT) 0.4 MG sublingual tablet Place 0.4 mg under the tongue as needed       ondansetron (ZOFRAN ODT) 4 MG ODT tab Take 1 tablet by mouth every 8 hours as needed       primidone (MYSOLINE) 50 MG tablet Increase as instructed to 2 pills twice per day. 120 tablet 11     Probiotic Product (PROBIOTIC PO) Take 1 capsule by mouth       prochlorperazine (COMPAZINE) 10 MG tablet Take 10 mg by mouth       sulfamethoxazole-trimethoprim (BACTRIM DS) 800-160 MG tablet Take 1 tablet by mouth Every Monday, Wednesday, and Friday       vitamin B-12 (CYANOCOBALAMIN) 1000 MCG tablet Take 1,000 mcg by mouth daily       zinc sulfate (ZINCATE) 220 (50 Zn) MG capsule Take 220 mg by mouth daily         Allergies   Allergen Reactions     Codeine Nausea and Vomiting and Nausea     Patient reports that he has tolerated morphine in the past  Patient reports that he has tolerated morphine in the past       Demerol [Meperidine] Nausea and Vomiting     Fentanyl Nausea and Vomiting     Prilocaine Hives and Unknown     Afluria Preservative Free Nausea and Vomiting     Anesthetics, Amide [Amides]      Dry heaves     Lidocaine Unknown and Other (See Comments)     Dry heaves  Dry heaves     Morphine Nausea and Nausea and Vomiting     Tamsulosin Other (See Comments)     Other reaction(s): Hypotension        Family History   Problem Relation Age of Onset     Osteoporosis Mother      Depression Father      Tremor Brother      Tremor Brother      Cancer Maternal Grandfather      Tremor Niece         Social History     Socioeconomic History     Marital status:      Spouse name: Not on file     Number of children: Not on file     Years of education: Not on file     Highest education level: Not on file   Occupational History     Not on file   Tobacco Use     Smoking status: Former     Packs/day: 4.00     Years: 7.00     Pack years: 28.00     Types: Cigarettes     Quit date: 6/30/1968     Years  since quittin.4     Smokeless tobacco: Never   Substance and Sexual Activity     Alcohol use: No     Comment: Alcoholic Drinks/day: quit      Drug use: No     Sexual activity: Not on file   Other Topics Concern     Not on file   Social History Narrative     Not on file     Social Determinants of Health     Financial Resource Strain: Not on file   Food Insecurity: Not on file   Transportation Needs: Not on file   Physical Activity: Not on file   Stress: Not on file   Social Connections: Not on file   Intimate Partner Violence: Not on file   Housing Stability: Not on file        PHYSICAL EXAM:  88, 122/73, 16  Motor (Performance) Sub-Scale 2022   Assessment Time 12:54 PM   Medication On   DBS - Right Brain None   DBS - Left Brain None   Head 2   Face & Jaw 2   Voice 1   Outstretched - RIGHT 2.5   Outstretched - LEFT 2   Wingbeating - RIGHT 3   Wingbeating - LEFT 3.5   Kinetic - RIGHT 2   Kinetic - LEFT 2   Lower Limb - RIGHT 2   Lower Limb - LEFT 2   Lower Limb (Max) 2   Spiral - RIGHT 3   Spiral - LEFT 2.5   Handwriting 4   Dot approx - RIGHT 3.5   Dot approx - LEFT 2.5   Trunk (Standing) 1   Total Right 16   Total Left 14.5   Axial 5   TOTAL 38.5         ASSESSMENT/PLAN:  Mr. Diego is an 81 yo right handed man who presents for motor testing as a part of his DBS evaluation.  He has completed his evaluation.  He reports a history of hyperglycemia but I could not find any prior hemoglobin A1Cs.      44 minutes spent on the date of the encounter doing chart review, history and exam, documentation and further activities as noted above.        Again, thank you for allowing me to participate in the care of your patient.      Sincerely,    Sada Matt MD

## 2022-11-22 ENCOUNTER — TELEPHONE (OUTPATIENT)
Dept: NEUROLOGY | Facility: CLINIC | Age: 82
End: 2022-11-22

## 2022-11-22 ENCOUNTER — MYC MEDICAL ADVICE (OUTPATIENT)
Dept: NEUROLOGY | Facility: CLINIC | Age: 82
End: 2022-11-22

## 2022-11-22 NOTE — TELEPHONE ENCOUNTER
"From 22 [employer] meetin. Candidate - Too risky surgically = No - FUS better or Tayler Trio  Increased risk of intracranial hemorrhage secondary to the atrophy, also making DBS implantation more difficult and more risky. Delayed SDH risk. Atrophy - brain shift - mapping challenging.  2. The plan for surgery - no  3. The  - NA  4. The motor symptoms we are treating include - tremor  5. Patient goal: To put a nut on a bolt, pour powder into a shell case, eat without spilling    Do we have all the imaging sequences needed? Yes  -----------------  Spoke to patient's daughter, Kathi. Explained that patient is not a candidate but there are alternatives. Will call patient next.    Patient stated, \"I'm almost relieved. I have been tossing and turning about this.\"    Writer told him about the options of a focused ultrasound at [facility] or the Tayler trio wrist device. He would like a prescription for the Tayler Trio. Writer will send him information about it and notify Dr. Matt. No further questions or concerns.    "

## 2022-11-22 NOTE — LETTER
December 23, 2022          To whom it may concern:    Mr. Mk Diego is my patient in our Movement Disorders Clinic for essential tremor.  His tremor is medication-refractory and he is not a candidate for deep brain stimulation.  He could benefit from the Tayler Trio device.  Of note, he does have plates and screws in each wrist.  I am aware that the device has not been satisfactory tested in patients with surgical hardware in the wrist.  He is aware of the potential risk for burn when using the Tayler Trio device under these circumstances and is willing to give it a trial anyway.  Please contact my office with any questions.    Sincerely,        Sada Matt MD   of Neurology  Movement Disorders Division

## 2022-11-28 NOTE — CONFIDENTIAL NOTE
"[email or web address]'s healthcare provider guide states \"Do not use this device on patients who have a cardiac pacemaker, implanted defibrillator, or other implanted electronic device, or implanted metal in the wrist, because this may cause electric shock, burns, electrical interference, or death.\"    Spoke with Nicole MURPHY With [employer]. She reports no type of metal in the wrist has been tested with jh trio. Safety of the use of the device cannot be guaranteed.  "

## 2022-12-27 NOTE — CONFIDENTIAL NOTE
Letter has been printed out and waiting for provider signature    Received signed letter back from provider and fax to [phone]

## 2023-01-10 ENCOUNTER — MEDICAL CORRESPONDENCE (OUTPATIENT)
Dept: NEUROLOGY | Facility: CLINIC | Age: 83
End: 2023-01-10

## 2023-01-12 ENCOUNTER — DOCUMENTATION ONLY (OUTPATIENT)
Dept: NEUROLOGY | Facility: CLINIC | Age: 83
End: 2023-01-12

## 2023-01-12 NOTE — PROGRESS NOTES
Faxed the [facility] patient form, prescription, demographic to [facility].

## 2023-02-04 ENCOUNTER — HEALTH MAINTENANCE LETTER (OUTPATIENT)
Age: 83
End: 2023-02-04

## 2023-08-14 ENCOUNTER — APPOINTMENT (OUTPATIENT)
Dept: URBAN - METROPOLITAN AREA CLINIC 252 | Age: 83
Setting detail: DERMATOLOGY
End: 2023-08-17

## 2023-08-14 VITALS — HEIGHT: 64 IN

## 2023-08-14 DIAGNOSIS — L85.3 XEROSIS CUTIS: ICD-10-CM

## 2023-08-14 DIAGNOSIS — L81.4 OTHER MELANIN HYPERPIGMENTATION: ICD-10-CM

## 2023-08-14 DIAGNOSIS — L57.8 OTHER SKIN CHANGES DUE TO CHRONIC EXPOSURE TO NONIONIZING RADIATION: ICD-10-CM

## 2023-08-14 DIAGNOSIS — Z71.89 OTHER SPECIFIED COUNSELING: ICD-10-CM

## 2023-08-14 DIAGNOSIS — D69.2 OTHER NONTHROMBOCYTOPENIC PURPURA: ICD-10-CM

## 2023-08-14 DIAGNOSIS — L98.8 OTHER SPECIFIED DISORDERS OF THE SKIN AND SUBCUTANEOUS TISSUE: ICD-10-CM

## 2023-08-14 DIAGNOSIS — L57.0 ACTINIC KERATOSIS: ICD-10-CM

## 2023-08-14 PROCEDURE — OTHER LIQUID NITROGEN: OTHER

## 2023-08-14 PROCEDURE — OTHER COUNSELING: OTHER

## 2023-08-14 PROCEDURE — 17000 DESTRUCT PREMALG LESION: CPT

## 2023-08-14 PROCEDURE — 99213 OFFICE O/P EST LOW 20 MIN: CPT | Mod: 25

## 2023-08-14 PROCEDURE — OTHER MIPS QUALITY: OTHER

## 2023-08-14 PROCEDURE — 17003 DESTRUCT PREMALG LES 2-14: CPT

## 2023-08-14 ASSESSMENT — LOCATION SIMPLE DESCRIPTION DERM
LOCATION SIMPLE: RIGHT LIP
LOCATION SIMPLE: RIGHT FOREARM
LOCATION SIMPLE: LEFT FOREARM
LOCATION SIMPLE: RIGHT ELBOW
LOCATION SIMPLE: RIGHT ZYGOMA
LOCATION SIMPLE: RIGHT UPPER ARM
LOCATION SIMPLE: LEFT ANTERIOR NECK
LOCATION SIMPLE: RIGHT TEMPLE
LOCATION SIMPLE: LEFT CHEEK

## 2023-08-14 ASSESSMENT — LOCATION ZONE DERM
LOCATION ZONE: LIP
LOCATION ZONE: NECK
LOCATION ZONE: ARM
LOCATION ZONE: FACE

## 2023-08-14 ASSESSMENT — LOCATION DETAILED DESCRIPTION DERM
LOCATION DETAILED: LEFT SUPERIOR CENTRAL MALAR CHEEK
LOCATION DETAILED: RIGHT PROXIMAL DORSAL FOREARM
LOCATION DETAILED: RIGHT ELBOW
LOCATION DETAILED: RIGHT DISTAL POSTERIOR UPPER ARM
LOCATION DETAILED: RIGHT MEDIAL ZYGOMA
LOCATION DETAILED: RIGHT INFERIOR VERMILION LIP
LOCATION DETAILED: RIGHT MEDIAL TEMPLE
LOCATION DETAILED: LEFT CLAVICULAR NECK
LOCATION DETAILED: LEFT PROXIMAL DORSAL FOREARM

## 2023-11-13 ENCOUNTER — TELEPHONE (OUTPATIENT)
Dept: NEUROLOGY | Facility: CLINIC | Age: 83
End: 2023-11-13
Payer: COMMERCIAL

## 2023-11-13 DIAGNOSIS — G25.0 ESSENTIAL TREMOR: ICD-10-CM

## 2023-11-13 RX ORDER — PRIMIDONE 50 MG/1
50 TABLET ORAL AT BEDTIME
Qty: 30 TABLET | Refills: 1 | Status: SHIPPED | OUTPATIENT
Start: 2023-11-13

## 2023-11-13 NOTE — TELEPHONE ENCOUNTER
Per request from provider, sched a follow up for pt for med refills..    I spoke with the pt's daughter, she stated that the pt does not have meds through Dr. Matt. However, had a bunch of questions, I advised the pt's daughter to reach out to the provider via MyC regarding the questions.    Pt's daughter stated that they would call back to scheduled a follow up if needed after speaking to the provider.    Message also sent to the provider regarding the conversation had with pt's daughter.    11/13/23 BD

## 2023-11-13 NOTE — CONFIDENTIAL NOTE
RX Authorization    Medication:  PRIMIDONE 50MG TAB    Date last refill ordered:  0/11/2023    Quantity ordered:  120    # refills:  11    Date of last clinic visit with ordering provider:  11/14/2022    Date of next clinic visit with ordering provider:    All pertinent protocol data (lab date/result):    Include pertinent information from patients message:

## 2024-03-03 ENCOUNTER — HEALTH MAINTENANCE LETTER (OUTPATIENT)
Age: 84
End: 2024-03-03

## 2024-09-11 ENCOUNTER — APPOINTMENT (OUTPATIENT)
Dept: URBAN - METROPOLITAN AREA CLINIC 252 | Age: 84
Setting detail: DERMATOLOGY
End: 2024-09-15

## 2024-09-11 VITALS — HEIGHT: 64 IN | WEIGHT: 208 LBS

## 2024-09-11 DIAGNOSIS — L57.0 ACTINIC KERATOSIS: ICD-10-CM

## 2024-09-11 DIAGNOSIS — D69.2 OTHER NONTHROMBOCYTOPENIC PURPURA: ICD-10-CM

## 2024-09-11 PROCEDURE — 17003 DESTRUCT PREMALG LES 2-14: CPT

## 2024-09-11 PROCEDURE — OTHER COUNSELING: OTHER

## 2024-09-11 PROCEDURE — 17000 DESTRUCT PREMALG LESION: CPT

## 2024-09-11 PROCEDURE — OTHER LIQUID NITROGEN: OTHER

## 2024-09-11 PROCEDURE — 99212 OFFICE O/P EST SF 10 MIN: CPT | Mod: 25

## 2024-09-11 ASSESSMENT — LOCATION SIMPLE DESCRIPTION DERM
LOCATION SIMPLE: LEFT CHEEK
LOCATION SIMPLE: NOSE
LOCATION SIMPLE: LEFT TEMPLE
LOCATION SIMPLE: RIGHT ZYGOMA
LOCATION SIMPLE: LEFT FOREARM
LOCATION SIMPLE: RIGHT FOREARM

## 2024-09-11 ASSESSMENT — LOCATION DETAILED DESCRIPTION DERM
LOCATION DETAILED: RIGHT CENTRAL ZYGOMA
LOCATION DETAILED: LEFT CENTRAL TEMPLE
LOCATION DETAILED: LEFT CENTRAL MALAR CHEEK
LOCATION DETAILED: RIGHT DISTAL DORSAL FOREARM
LOCATION DETAILED: LEFT SUPERIOR LATERAL MALAR CHEEK
LOCATION DETAILED: LEFT DISTAL DORSAL FOREARM
LOCATION DETAILED: RIGHT NASAL DORSUM

## 2024-09-11 ASSESSMENT — LOCATION ZONE DERM
LOCATION ZONE: ARM
LOCATION ZONE: FACE
LOCATION ZONE: NOSE

## 2024-09-11 NOTE — PROCEDURE: LIQUID NITROGEN
Detail Level: Detailed
Render Post-Care Instructions In Note?: yes
Duration Of Freeze Thaw-Cycle (Seconds): 0
Application Tool (Optional): Liquid Nitrogen Sprayer
Show Aperture Variable?: No
Consent: - Discussed that these are a result of cumulative sun exposure.\\n- Consent was obtained and risks were reviewed prior to procedure today. All questions were answered prior to procedure today.\\n- Risks discussed include but are not limited to pain, crusting, scabbing, blistering, scarring, temporary or permanent darker or lighter pigmentary change, recurrence, incomplete resolution, and infection.
Post-Care Instructions: - Patient was instructed to avoid picking at any of the treated lesions.
[facility]

## 2025-03-15 ENCOUNTER — HEALTH MAINTENANCE LETTER (OUTPATIENT)
Age: 85
End: 2025-03-15